# Patient Record
Sex: MALE | Race: WHITE | Employment: FULL TIME | URBAN - METROPOLITAN AREA
[De-identification: names, ages, dates, MRNs, and addresses within clinical notes are randomized per-mention and may not be internally consistent; named-entity substitution may affect disease eponyms.]

---

## 2017-01-10 ENCOUNTER — TELEPHONE (OUTPATIENT)
Dept: NURSING | Facility: CLINIC | Age: 52
End: 2017-01-10

## 2017-01-10 NOTE — TELEPHONE ENCOUNTER
"Call Type: Triage Call    Presenting Problem: Patient states he had \"Diarrhea\" on  01/08/17  lasting for 24  hours.  Now, symptoms of watery stools and \"Gas\"  have returned. Last voided over 12 hours ago.  Triage Note:  Guideline Title: Diarrhea or Other Change in Bowel Habits  Recommended Disposition: See ED Immediately  Original Inclination: Would have called clinic  Override Disposition:  Intended Action: Go to Hospital / ED  Physician Contacted: No  Signs of dehydration ?  YES  New or worsening signs and symptoms that may indicate shock ? NO  Passing red, black or tarry material from rectum AND onset of new signs and  symptoms of hypovolemia ? NO  Rectal bleeding (blood in or on the stool, more than scant; black tarry stools) ?  NO  Known diabetic and diarrhea or other change in bowel habits ? NO  High to low (but not zero) risk of exposure to Ebola within the past 21 days ? NO  Physician Instructions:  Care Advice: Protect the patient from falling or other harm.  Another adult should drive.  Call  if signs and symptoms of shock develop (such as unable to  stand due to faintness, dizziness, or lightheadedness  new onset of confusion  slow to respond or difficult to awaken  skin is pale, gray, cool, or moist to touch  severe weakness  loss of consciousness).  IMMEDIATE ACTION  Change position slowly.  Sit for a couple of minutes before standing to  walk.  Quick position changes may cause or worsen symptoms.  Write down provider's name. List or place the following in a bag for  transport with the patient: current prescription and/or nonprescription  medications  alternative treatments, therapies and medications  and street drugs.  Take sips of clear liquids (such as water, clear fruit juices without pulp,  soda, tea or coffee without dairy or non-dairy creamer, clear broth or  bouillon, oral hydration solution, or plain gelatin, fruit ices/popsicles,  hard candy) as tolerated. Sucking on ice chips is " another option.

## 2017-04-07 ENCOUNTER — TELEPHONE (OUTPATIENT)
Dept: FAMILY MEDICINE | Facility: CLINIC | Age: 52
End: 2017-04-07

## 2017-04-07 ENCOUNTER — OFFICE VISIT (OUTPATIENT)
Dept: FAMILY MEDICINE | Facility: OTHER | Age: 52
End: 2017-04-07
Payer: COMMERCIAL

## 2017-04-07 ENCOUNTER — RADIANT APPOINTMENT (OUTPATIENT)
Dept: GENERAL RADIOLOGY | Facility: OTHER | Age: 52
End: 2017-04-07
Attending: NURSE PRACTITIONER
Payer: COMMERCIAL

## 2017-04-07 VITALS
HEART RATE: 98 BPM | SYSTOLIC BLOOD PRESSURE: 122 MMHG | DIASTOLIC BLOOD PRESSURE: 72 MMHG | WEIGHT: 216 LBS | RESPIRATION RATE: 20 BRPM | HEIGHT: 71 IN | BODY MASS INDEX: 30.24 KG/M2 | OXYGEN SATURATION: 96 % | TEMPERATURE: 98.1 F

## 2017-04-07 DIAGNOSIS — R07.9 CHEST PAIN, UNSPECIFIED TYPE: Primary | ICD-10-CM

## 2017-04-07 DIAGNOSIS — R07.9 CHEST PAIN, UNSPECIFIED TYPE: ICD-10-CM

## 2017-04-07 DIAGNOSIS — R73.9 BLOOD GLUCOSE ELEVATED: ICD-10-CM

## 2017-04-07 DIAGNOSIS — R06.02 SHORTNESS OF BREATH: ICD-10-CM

## 2017-04-07 LAB
ANION GAP SERPL CALCULATED.3IONS-SCNC: 8 MMOL/L (ref 3–14)
BUN SERPL-MCNC: 11 MG/DL (ref 7–30)
CALCIUM SERPL-MCNC: 8.5 MG/DL (ref 8.5–10.1)
CHLORIDE SERPL-SCNC: 104 MMOL/L (ref 94–109)
CO2 SERPL-SCNC: 27 MMOL/L (ref 20–32)
CREAT SERPL-MCNC: 0.7 MG/DL (ref 0.66–1.25)
D DIMER PPP FEU-MCNC: 0.3 UG/ML FEU (ref 0–0.5)
ERYTHROCYTE [DISTWIDTH] IN BLOOD BY AUTOMATED COUNT: 12.6 % (ref 10–15)
GFR SERPL CREATININE-BSD FRML MDRD: ABNORMAL ML/MIN/1.7M2
GLUCOSE SERPL-MCNC: 228 MG/DL (ref 70–99)
HCT VFR BLD AUTO: 45.9 % (ref 40–53)
HGB BLD-MCNC: 16.8 G/DL (ref 13.3–17.7)
MCH RBC QN AUTO: 29 PG (ref 26.5–33)
MCHC RBC AUTO-ENTMCNC: 36.6 G/DL (ref 31.5–36.5)
MCV RBC AUTO: 79 FL (ref 78–100)
PLATELET # BLD AUTO: 269 10E9/L (ref 150–450)
POTASSIUM SERPL-SCNC: 3.9 MMOL/L (ref 3.4–5.3)
RBC # BLD AUTO: 5.8 10E12/L (ref 4.4–5.9)
SODIUM SERPL-SCNC: 139 MMOL/L (ref 133–144)
TROPONIN I SERPL-MCNC: NORMAL UG/L (ref 0–0.04)
WBC # BLD AUTO: 6.3 10E9/L (ref 4–11)

## 2017-04-07 PROCEDURE — 71020 XR CHEST 2 VW: CPT

## 2017-04-07 PROCEDURE — 36415 COLL VENOUS BLD VENIPUNCTURE: CPT | Performed by: NURSE PRACTITIONER

## 2017-04-07 PROCEDURE — 99203 OFFICE O/P NEW LOW 30 MIN: CPT | Performed by: NURSE PRACTITIONER

## 2017-04-07 PROCEDURE — 93000 ELECTROCARDIOGRAM COMPLETE: CPT | Performed by: NURSE PRACTITIONER

## 2017-04-07 PROCEDURE — 85379 FIBRIN DEGRADATION QUANT: CPT | Performed by: NURSE PRACTITIONER

## 2017-04-07 PROCEDURE — 80048 BASIC METABOLIC PNL TOTAL CA: CPT | Performed by: NURSE PRACTITIONER

## 2017-04-07 PROCEDURE — 85027 COMPLETE CBC AUTOMATED: CPT | Performed by: NURSE PRACTITIONER

## 2017-04-07 PROCEDURE — 84484 ASSAY OF TROPONIN QUANT: CPT | Performed by: NURSE PRACTITIONER

## 2017-04-07 NOTE — PROGRESS NOTES
"  SUBJECTIVE:                                                    Ray Galdamez is a 51 year old male who presents to clinic today for the following health issues:      BREATHING PROBLEM      Duration: this morning    Description (location/character/radiation): hard to get enough air this a.m.    Intensity:  mild    Accompanying signs and symptoms: ribs sore on Left side, Left side chest feels odd, muscles tight.    History (similar episodes/previous evaluation): None    Precipitating or alleviating factors: None    Therapies tried and outcome: None     He woke up this morning feeling short of breath.  It was hard to get breath in on his left side.  This lasted 1 hour and is now nearly resolved.  Still has left side \"soreness\". States it just doesn't feel right, like my muscles are tight.  No nausea, vomiting, diaphoresis.  He was able to reproduce the pain at home with palpation of his left rib area.       No fevers/chills or cough.   Has not been ill recently.   Was lifting furniture yesterday, but no injury that he knows of  No history of cardiac or lung disease.   He quit smoking 8 years ago.   Family history of cardiac disease in his father and paternal uncles, nothing prior to age 50.     Problem list and histories reviewed & adjusted, as indicated.  Additional history: none    Patient Active Problem List   Diagnosis     CARDIOVASCULAR SCREENING; LDL GOAL LESS THAN 160     History reviewed. No pertinent surgical history.    Social History   Substance Use Topics     Smoking status: Never Smoker     Smokeless tobacco: Never Used     Alcohol use Not on file     History reviewed. No pertinent family history.      Current Outpatient Prescriptions   Medication Sig Dispense Refill     [DISCONTINUED] UNKNOWN TO PATIENT Took generic children's allergy medication       No Known Allergies  BP Readings from Last 3 Encounters:   04/07/17 122/72   10/10/14 140/90   03/10/14 116/85    Wt Readings from Last 3 Encounters: " "  04/07/17 216 lb (98 kg)   03/10/14 220 lb (99.8 kg)   05/10/12 229 lb (103.9 kg)                    Reviewed and updated as needed this visit by clinical staff  Tobacco  Allergies  Meds  Med Hx  Surg Hx  Fam Hx  Soc Hx      Reviewed and updated as needed this visit by Provider         ROS:  C: NEGATIVE for fever, chills, change in weight  E/M: NEGATIVE for ear, mouth and throat problems  RESP:as above   CV: as above   GI: NEGATIVE for nausea, abdominal pain, heartburn, or change in bowel habits    OBJECTIVE:                                                    /72  Pulse 98  Temp 98.1  F (36.7  C) (Tympanic)  Resp 20  Ht 5' 10.5\" (1.791 m)  Wt 216 lb (98 kg)  SpO2 96%  BMI 30.55 kg/m2  Body mass index is 30.55 kg/(m^2).  GENERAL: healthy, alert and no distress  EYES: Eyes grossly normal to inspection, PERRL and conjunctivae and sclerae normal  HENT: ear canals and TM's normal, nose and mouth without ulcers or lesions  NECK: no adenopathy, no asymmetry, masses, or scars and thyroid normal to palpation  RESP: lungs clear to auscultation - no rales, rhonchi or wheezes  CV: regular rate and rhythm, normal S1 S2, no S3 or S4, no murmur, click or rub, no peripheral edema.  I cannot reproduce the chest pain with palpation   ABDOMEN: soft, nontender, no hepatosplenomegaly, no masses and bowel sounds normal  MS: no gross musculoskeletal defects noted, no edema    Diagnostic Test Results:  Office Visit on 04/07/2017   Component Date Value Ref Range Status     Troponin I ES 04/07/2017   0.000 - 0.045 ug/L Final                    Value:<0.015  The 99th percentile for upper reference range is 0.045 ug/L.  Troponin values in   the range of 0.045 - 0.120 ug/L may be associated with risks of adverse   clinical events.       WBC 04/07/2017 6.3  4.0 - 11.0 10e9/L Final     RBC Count 04/07/2017 5.80  4.4 - 5.9 10e12/L Final     Hemoglobin 04/07/2017 16.8  13.3 - 17.7 g/dL Final     Hematocrit 04/07/2017 45.9  40.0 " - 53.0 % Final     MCV 04/07/2017 79  78 - 100 fl Final     MCH 04/07/2017 29.0  26.5 - 33.0 pg Final     MCHC 04/07/2017 36.6* 31.5 - 36.5 g/dL Final     RDW 04/07/2017 12.6  10.0 - 15.0 % Final     Platelet Count 04/07/2017 269  150 - 450 10e9/L Final     Sodium 04/07/2017 139  133 - 144 mmol/L Final     Potassium 04/07/2017 3.9  3.4 - 5.3 mmol/L Final     Chloride 04/07/2017 104  94 - 109 mmol/L Final     Carbon Dioxide 04/07/2017 27  20 - 32 mmol/L Final     Anion Gap 04/07/2017 8  3 - 14 mmol/L Final     Glucose 04/07/2017 228* 70 - 99 mg/dL Final     Urea Nitrogen 04/07/2017 11  7 - 30 mg/dL Final     Creatinine 04/07/2017 0.70  0.66 - 1.25 mg/dL Final     GFR Estimate 04/07/2017   >60 mL/min/1.7m2 Final                    Value:>90  Non  GFR Calc       GFR Estimate If Black 04/07/2017   >60 mL/min/1.7m2 Final                    Value:>90   GFR Calc       Calcium 04/07/2017 8.5  8.5 - 10.1 mg/dL Final     D Dimer 04/07/2017 0.3  0.0 - 0.50 ug/ml FEU Final    Comment: This D-dimer assay is intended for use in conjuntion with a clinical pretest   probability assessment model to exclude pulmonary embolism (PE) and as an aid   in the diagnosis of deep venous thrombosis (DVT) in outpatients suspected of   PE   or DVT. The cut-off value is 0.5 g/mL FEU.           CXR - negative by my reading.  Pending radiology review.   EKG - appears normal, NSR, normal axis, normal intervals, no acute ST/T changes c/w ischemia, no LVH by voltage criteria, there are no prior tracings available     ASSESSMENT/PLAN:                                                        1. Chest pain, unspecified type  Labs, EKG reassuring.  Pain is nearly resolved and he was able to reproduce it with palpation this morning (I was not able to on my exam today, however).  This is likely musculoskeletal in nature and I advised rest and anti-inflammatory medications.  However, given his age and family history, if this  does not completely resolve or recurs, he will need a stress test.  He is agreeable to this and will contact us if this fails to resolve.    - Troponin I  - EKG 12-lead complete w/read - Clinics  - CBC with platelets  - Basic metabolic panel  (Ca, Cl, CO2, Creat, Gluc, K, Na, BUN)  - XR Chest 2 Views; Future  - D dimer, quantitative    2. Shortness of breath  As above   - Troponin I  - EKG 12-lead complete w/read - Clinics  - CBC with platelets  - Basic metabolic panel  (Ca, Cl, CO2, Creat, Gluc, K, Na, BUN)  - XR Chest 2 Views; Future  - D dimer, quantitative    See Patient Instructions    TERI Leach [facility]

## 2017-04-07 NOTE — PATIENT INSTRUCTIONS
We will call you this afternoon with the lab results.     I did not see any abnormalities on your x-ray.  The radiologist will review it as well and they will call if they see anything I did not see.     Rest for a few days and take Ibuprofen as needed     If this does not go completely away, you need a stress test.  Call me in that event.

## 2017-04-07 NOTE — NURSING NOTE
"Chief Complaint   Patient presents with     Breathing Problem     trouble breathing upon waking this a.m.       Initial /72  Pulse 98  Temp 98.1  F (36.7  C) (Tympanic)  Resp 20  Ht 5' 10.5\" (1.791 m)  Wt 216 lb (98 kg)  SpO2 96%  BMI 30.55 kg/m2 Estimated body mass index is 30.55 kg/(m^2) as calculated from the following:    Height as of this encounter: 5' 10.5\" (1.791 m).    Weight as of this encounter: 216 lb (98 kg).  Medication Reconciliation: complete   ................Xavi Doshi LPN,   April 7, 2017,      9:51 AM,   [facility]     "

## 2017-04-07 NOTE — MR AVS SNAPSHOT
"              After Visit Summary   4/7/2017    Ray Galdamez    MRN: [id number]           Patient Information     Date Of Birth          1965        Visit Information        Provider Department      4/7/2017 10:00 AM Mary Dasilva APRN [facility]        Today's Diagnoses     Chest pain, unspecified type    -  1    Shortness of breath          Care Instructions    We will call you this afternoon with the lab results.     I did not see any abnormalities on your x-ray.  The radiologist will review it as well and they will call if they see anything I did not see.     Rest for a few days and take Ibuprofen as needed     If this does not go completely away, you need a stress test.  Call me in that event.                 Follow-ups after your visit        Who to contact     If you have questions or need follow up information about today's clinic visit or your schedule please contact [facility] directly at [phone].  Normal or non-critical lab and imaging results will be communicated to you by MyChart, letter or phone within 4 business days after the clinic has received the results. If you do not hear from us within 7 days, please contact the clinic through Tweetworks[employer] or phone. If you have a critical or abnormal lab result, we will notify you by phone as soon as possible.  Submit refill requests through [employer] or call your pharmacy and they will forward the refill request to us. Please allow 3 business days for your refill to be completed.          Additional Information About Your Visit        MyChart Information     [employer] lets you send messages to your doctor, view your test results, renew your prescriptions, schedule appointments and more. To sign up, go to [email or web address] . Click on \"Log in\" on the left side of the screen, which will take you to the Welcome page. Then click on \"Sign up Now\" on the right side of the page.     You will be asked to enter the access " "code listed below, as well as some personal information. Please follow the directions to create your username and password.     Your access code is: [email or web address]  Expires: 2017 10:39 AM     Your access code will  in 90 days. If you need help or a new code, please call your [facility] or [phone].        Care EveryWhere ID     This is your Care EveryWhere ID. This could be used by other organizations to access your [place] medical records  [id number]        Your Vitals Were     Pulse Temperature Respirations Height Pulse Oximetry BMI (Body Mass Index)    98 98.1  F (36.7  C) (Tympanic) 20 5' 10.5\" (1.791 m) 96% 30.55 kg/m2       Blood Pressure from Last 3 Encounters:   17 122/72   10/10/14 140/90   03/10/14 116/85    Weight from Last 3 Encounters:   17 216 lb (98 kg)   03/10/14 220 lb (99.8 kg)   05/10/12 229 lb (103.9 kg)              We Performed the Following     Basic metabolic panel  (Ca, Cl, CO2, Creat, Gluc, K, Na, BUN)     CBC with platelets     D dimer, quantitative     EKG 12-lead complete w/read - Clinics     Troponin I        Primary Care Provider Office Phone #    [place] [facility] [phone]       No address on file        Thank you!     Thank you for choosing [facility]  for your care. Our goal is always to provide you with excellent care. Hearing back from our patients is one way we can continue to improve our services. Please take a few minutes to complete the written survey that you may receive in the mail after your visit with us. Thank you!             Your Updated Medication List - Protect others around you: Learn how to safely use, store and throw away your medicines at www.disposemymeds.org.      Notice  As of 2017 10:39 AM    You have not been prescribed any medications.      "

## 2017-04-10 ENCOUNTER — TELEPHONE (OUTPATIENT)
Dept: FAMILY MEDICINE | Facility: OTHER | Age: 52
End: 2017-04-10

## 2017-04-10 DIAGNOSIS — R73.9 ELEVATED SERUM GLUCOSE: Primary | ICD-10-CM

## 2017-04-10 NOTE — TELEPHONE ENCOUNTER
Reason for Call:  Request for results:    Name of test or procedure: lab     Date of test of procedure: 4/7/17    Location of the test or procedure: [facility]    OK to leave the result message on voice mail or with a family member? YES    Phone number Patient can be reached at:  Home number on file [phone] (home)    Additional comments: Requesting results     Call taken on 4/10/2017 at 1:20 PM by Marina Mims

## 2017-04-10 NOTE — TELEPHONE ENCOUNTER
Notes Recorded by Mary Dasilva APRN CNP on 4/7/2017 at 1:15 PM  Please call patient and advise all his lab testing was normal and the radiologist did not see anything abnormal on his chest x-ray.  If his symptoms don't completely resolve, or if this happens again, he needs to get a stress test done.  Have him contact us in that event.     Patient notified of results, a1c ordered and pt to set up lab only appt. Patient voiced understanding.   Xavi Doshi LPN,  April 10, 2017 3:41 PM,  [facility]

## 2017-04-10 NOTE — PATIENT INSTRUCTIONS
Notes Recorded by Mary Dasilva APRN CNP on 4/7/2017 at 1:15 PM  Please call patient and advise all his lab testing was normal and the radiologist did not see anything abnormal on his chest x-ray.  If his symptoms don't completely resolve, or if this happens again, he needs to get a stress test done.  Have him contact us in that event.

## 2017-04-10 NOTE — PROGRESS NOTES
Called pt - informed of results. Patient voiced understanding.   ................Xavi Doshi LPN,   April 10, 2017,      4:00 PM,   [facility]

## 2017-11-28 ENCOUNTER — OFFICE VISIT (OUTPATIENT)
Dept: URGENT CARE | Facility: RETAIL CLINIC | Age: 52
End: 2017-11-28
Payer: COMMERCIAL

## 2017-11-28 ENCOUNTER — TELEPHONE (OUTPATIENT)
Dept: FAMILY MEDICINE | Facility: CLINIC | Age: 52
End: 2017-11-28

## 2017-11-28 VITALS
HEART RATE: 77 BPM | TEMPERATURE: 97.9 F | SYSTOLIC BLOOD PRESSURE: 126 MMHG | BODY MASS INDEX: 30.55 KG/M2 | OXYGEN SATURATION: 95 % | WEIGHT: 216 LBS | DIASTOLIC BLOOD PRESSURE: 90 MMHG

## 2017-11-28 DIAGNOSIS — R03.0 ELEVATED BLOOD PRESSURE READING WITHOUT DIAGNOSIS OF HYPERTENSION: ICD-10-CM

## 2017-11-28 DIAGNOSIS — L03.114 CELLULITIS OF HAND, LEFT: Primary | ICD-10-CM

## 2017-11-28 PROCEDURE — 99203 OFFICE O/P NEW LOW 30 MIN: CPT | Performed by: PHYSICIAN ASSISTANT

## 2017-11-28 RX ORDER — CEPHALEXIN 500 MG/1
500 CAPSULE ORAL 3 TIMES DAILY
Qty: 30 CAPSULE | Refills: 0 | Status: SHIPPED | OUTPATIENT
Start: 2017-11-28 | End: 2018-03-21

## 2017-11-28 ASSESSMENT — PAIN SCALES - GENERAL: PAINLEVEL: NO PAIN (0)

## 2017-11-28 NOTE — MR AVS SNAPSHOT
"              After Visit Summary   11/28/2017    Ray Galdamez    MRN: [id number]           Patient Information     Date Of Birth          1965        Visit Information        Provider Department      11/28/2017 5:40 PM Jeny Romero PA-C [facility]        Today's Diagnoses     Cellulitis of hand, left    -  1      Care Instructions    Your blood pressure is elevated at today's visit.  Please check your BP twice in the next week or so.  You should follow up with your primary provider regarding possible hypertension if your recheck are greater than 140/90.  Check your blood pressure several times in the next week or so. You can do this at local pharmacies, grocery stores or with the float nurse at the [facility]. Record your readings and take them with you to your appointment.  Goal BP <140/90 (new < 130/80)  Do not take decongestants - they can raise your BP.  If you have chest pain, unusual headaches, vision changes or any sign or symptoms of stroke seek prompt medical attention.    /90 (BP Location: Right arm, Patient Position: Chair, Cuff Size: Adult Large)  Pulse 77  Temp 97.9  F (36.6  C) (Tympanic)  Wt 216 lb (98 kg)  SpO2 95%  BMI 30.55 kg/m2    Please FOLLOW UP at primary care clinic if not improving, new symptoms, worse or this does not resolve.            Follow-ups after your visit        Who to contact     You can reach your care team any time of the day by calling [phone].  Notification of test results:  If you have an abnormal lab result, we will notify you by phone as soon as possible.         Additional Information About Your Visit        Evolero Information     E[employer]ero lets you send messages to your doctor, view your test results, renew your prescriptions, schedule appointments and more. To sign up, go to [email or web address] . Click on \"Log in\" on the left side of the screen, which will take you to the Welcome page. Then click on \"Sign up " "Now\" on the right side of the page.     You will be asked to enter the access code listed below, as well as some personal information. Please follow the directions to create your username and password.     Your access code is: [email or web address]  Expires: 2018  6:19 PM     Your access code will  in 90 days. If you need help or a new code, please call your [facility] or [phone].        Care EveryWhere ID     This is your Care EveryWhere ID. This could be used by other organizations to access your [place] medical records  [id number]        Your Vitals Were     Pulse Temperature Pulse Oximetry BMI (Body Mass Index)          77 97.9  F (36.6  C) (Tympanic) 95% 30.55 kg/m2         Blood Pressure from Last 3 Encounters:   17 126/90   17 122/72   10/10/14 140/90    Weight from Last 3 Encounters:   17 216 lb (98 kg)   17 216 lb (98 kg)   03/10/14 220 lb (99.8 kg)              Today, you had the following     No orders found for display         Today's Medication Changes          These changes are accurate as of: 17  6:20 PM.  If you have any questions, ask your nurse or doctor.               Start taking these medicines.        Dose/Directions    cephALEXin 500 MG capsule   Commonly known as:  KEFLEX   Used for:  Cellulitis of hand, left   Started by:  Jeny Romero PA-C        Dose:  500 mg   Take 1 capsule (500 mg) by mouth 3 times daily   Quantity:  30 capsule   Refills:  0            Where to get your medicines      These medications were sent to [address] - [address]  [address], [facility] [address]     Phone:  [phone]     cephALEXin 500 MG capsule                Primary Care Provider Office Phone # Fax #    [place] [facility] [phone]       [address] [facility] [address]        Equal Access to Services     ASUNCION YOUNG AH: Hadii viki mena hadasho Soomaali, waaxda luqadaha, qaybta kaalmada chapin, jose manuel " tenzin caglebayron bond'aan ah. So [facility] [phone].    ATENCIÓN: Si habla kaushal, tiene a steele disposición servicios gratuitos de asistencia lingüística. Rowdy al [phone].    We comply with applicable federal civil rights laws and [place] laws. We do not discriminate on the basis of race, color, national origin, age, disability, sex, sexual orientation, or gender identity.            Thank you!     Thank you for choosing [facility]  for your care. Our goal is always to provide you with excellent care. Hearing back from our patients is one way we can continue to improve our services. Please take a few minutes to complete the written survey that you may receive in the mail after your visit with us. Thank you!             Your Updated Medication List - Protect others around you: Learn how to safely use, store and throw away your medicines at www.disposemymeds.org.          This list is accurate as of: 11/28/17  6:20 PM.  Always use your most recent med list.                   Brand Name Dispense Instructions for use Diagnosis    cephALEXin 500 MG capsule    KEFLEX    30 capsule    Take 1 capsule (500 mg) by mouth 3 times daily    Cellulitis of hand, left

## 2017-11-28 NOTE — NURSING NOTE
"Chief Complaint   Patient presents with     Hand Pain     left hand fourth didgit, was poked with a screw on sunday at home, barley poked the skin he states one little drop of blood. Spoke with nurse/PCP today was told have finger checked for infection. Has been doing ice at home. Last Tdap 9/27/2014       Initial /90 (BP Location: Right arm, Patient Position: Chair, Cuff Size: Adult Large)  Pulse 77  Temp 97.9  F (36.6  C) (Tympanic)  Wt 216 lb (98 kg)  SpO2 95%  BMI 30.55 kg/m2 Estimated body mass index is 30.55 kg/(m^2) as calculated from the following:    Height as of 4/7/17: 5' 10.5\" (1.791 m).    Weight as of this encounter: 216 lb (98 kg).  Medication Reconciliation: complete     Jessica Sundet      "

## 2017-11-28 NOTE — TELEPHONE ENCOUNTER
Reason for call:  Patient reporting a symptom    Symptom or request: possible infected finger     Duration (how long have symptoms been present): 3 days     Have you been treated for this before? No    Additional comments: Poked finger with a screw on 11/26 and now has a swollen finger. He is wondering if he should be seen or looking for nurse advice on how to take care of it.   Phone Number patient can be reached at:  Home number on file [phone] (home)    Best Time:       Can we leave a detailed message on this number:  YES    Call taken on 11/28/2017 at 9:03 AM by Marina Mims

## 2017-11-28 NOTE — TELEPHONE ENCOUNTER
Called and spoke to the patient. He said on Sunday he was out in his garage when he pricked his finger with a screw on accident. Patient said the area swelled up pretty good and became pretty sore. Patient said since then, his finger feels really stiff. He said the finger is still swollen and the finger was slightly warm to the touch this morning. Patient said he can bend the finger but it is hard due to the swelling. He said the finger is slightly red. No drainage. No fever. Patient said he has been icing the area. Patient thinks he last Tetanus was about 5 years ago at a different facility. He said besides the slight redness, you can't really tell anything is there.     Patient is wondering if he should keep icing the area and watching it? Or if he should come in and have someone look at it? He is concerned about the swelling and finger feeling stiff.     Will route to Dr. SEVERINO (NADIRA) since Mary is not in clinic.     lGoria Sadler RN  [facility]

## 2017-11-28 NOTE — TELEPHONE ENCOUNTER
Per Dr. Díaz, patient should be seen. Patient was offered an appointment tomorrow with Dr. SEVERINO but he declined. Nurse sent back to triage to call patient back.     RN called and spoke to patient. Informed him he should be seen to rule out infection. Offered him a 12:00 appointment tomorrow and patient declined. RN looked at both [facility] and [facility] today and tomorrow and no other options. Patient said he would head to [facility] today to have it looked at. He will call back with any other questions or concerns.     Gloria Sadler RN  [facility]

## 2017-11-29 NOTE — PATIENT INSTRUCTIONS
Your blood pressure is elevated at today's visit.  Please check your BP twice in the next week or so.  You should follow up with your primary provider regarding possible hypertension if your recheck are greater than 140/90.  Check your blood pressure several times in the next week or so. You can do this at local pharmacies, grocery stores or with the float nurse at the [facility]. Record your readings and take them with you to your appointment.  Goal BP <140/90 (new < 130/80)  Do not take decongestants - they can raise your BP.  If you have chest pain, unusual headaches, vision changes or any sign or symptoms of stroke seek prompt medical attention.    /90 (BP Location: Right arm, Patient Position: Chair, Cuff Size: Adult Large)  Pulse 77  Temp 97.9  F (36.6  C) (Tympanic)  Wt 216 lb (98 kg)  SpO2 95%  BMI 30.55 kg/m2    Please FOLLOW UP at primary care clinic if not improving, new symptoms, worse or this does not resolve.

## 2017-11-29 NOTE — PROGRESS NOTES
[facility]  See CC. Possible infection left 4th finger. He had a small puncture wound MIP and yest was redder, warmer and today a little better but some redness and tenderness into palm.    ROS:He does not feel ill. No other concerns.  ENT - denies ear pain, throat pain. No nasal congestion.  CP - no cough,SOB or chest pain.   GI/ - Appetite - normal. No nausea, vomiting or diarrhea.   No bowel or bladder changes   MSK - no joint pain or swelling other than left hand.  Skin-  NO rashes. NO lesions.  Last Td - 2014    No past medical history on file.  No past surgical history on file.  Patient Active Problem List   Diagnosis     CARDIOVASCULAR SCREENING; LDL GOAL LESS THAN 160     Current Outpatient Prescriptions   Medication     [DISCONTINUED] UNKNOWN TO PATIENT     No current facility-administered medications for this visit.      O: /90 (BP Location: Right arm, Patient Position: Chair, Cuff Size: Adult Large)  Pulse 77  Temp 97.9  F (36.6  C) (Tympanic)  Wt 216 lb (98 kg)  SpO2 95%  BMI 30.55 kg/m2  Left 4th finger there is some redness and swelling proximal phalanyx - ROM complete but tender with extension. Faint redness into palm - outlined in skin marker. Small puncture wound noted fat pad but minimal redness there.  No open areas.  Circ and sensation WNL    A:    Cellulitis of hand, left  Elevated blood pressure reading without diagnosis of hypertension      P: Warm packs  Elevate  < use  Prescriptions as below. Discussed indications, dosing, side affects and adverse reactions of medications with  Patient -Ceph - eat yogurt -take a probiotic daily when on antibiotics.  If > redness beyond skin marker or 'worse' or not improving seek prompt medical attention.  Also discussed BP -needs recheck    Declined note for work    AVS given and discussed:  Patient Instructions   Your blood pressure is elevated at today's visit.  Please check your BP twice in the next week or so.  You  should follow up with your primary provider regarding possible hypertension if your recheck are greater than 140/90.  Check your blood pressure several times in the next week or so. You can do this at local pharmacies, grocery stores or with the float nurse at the [facility]. Record your readings and take them with you to your appointment.  Goal BP <140/90 (new < 130/80)  Do not take decongestants - they can raise your BP.  If you have chest pain, unusual headaches, vision changes or any sign or symptoms of stroke seek prompt medical attention.    /90 (BP Location: Right arm, Patient Position: Chair, Cuff Size: Adult Large)  Pulse 77  Temp 97.9  F (36.6  C) (Tympanic)  Wt 216 lb (98 kg)  SpO2 95%  BMI 30.55 kg/m2    Please FOLLOW UP at primary care clinic if not improving, new symptoms, worse or this does not resolve.        Patient is comfortable with this plan  Electronically signed,  GEOVANNY Romero, PAC

## 2018-03-21 ENCOUNTER — OFFICE VISIT (OUTPATIENT)
Dept: FAMILY MEDICINE | Facility: CLINIC | Age: 53
End: 2018-03-21
Payer: COMMERCIAL

## 2018-03-21 VITALS
HEIGHT: 71 IN | HEART RATE: 76 BPM | DIASTOLIC BLOOD PRESSURE: 80 MMHG | BODY MASS INDEX: 30.38 KG/M2 | RESPIRATION RATE: 16 BRPM | OXYGEN SATURATION: 96 % | WEIGHT: 217 LBS | TEMPERATURE: 97.6 F | SYSTOLIC BLOOD PRESSURE: 128 MMHG

## 2018-03-21 DIAGNOSIS — Z12.11 SPECIAL SCREENING FOR MALIGNANT NEOPLASMS, COLON: ICD-10-CM

## 2018-03-21 DIAGNOSIS — R06.83 SNORING: ICD-10-CM

## 2018-03-21 DIAGNOSIS — R63.1 POLYDIPSIA: ICD-10-CM

## 2018-03-21 DIAGNOSIS — Z00.01 ENCOUNTER FOR WELL ADULT EXAM WITH ABNORMAL FINDINGS: Primary | ICD-10-CM

## 2018-03-21 DIAGNOSIS — G47.33 OSA (OBSTRUCTIVE SLEEP APNEA): ICD-10-CM

## 2018-03-21 LAB
CHOLEST SERPL-MCNC: 267 MG/DL
GLUCOSE SERPL-MCNC: 189 MG/DL (ref 70–99)
HBA1C MFR BLD: 11.4 % (ref 4.3–6)
HDLC SERPL-MCNC: 34 MG/DL
LDLC SERPL CALC-MCNC: ABNORMAL MG/DL
NONHDLC SERPL-MCNC: 233 MG/DL
PSA SERPL-ACNC: 0.78 UG/L (ref 0–4)
TRIGL SERPL-MCNC: 938 MG/DL

## 2018-03-21 PROCEDURE — 80061 LIPID PANEL: CPT | Performed by: OBSTETRICS & GYNECOLOGY

## 2018-03-21 PROCEDURE — 36415 COLL VENOUS BLD VENIPUNCTURE: CPT | Performed by: OBSTETRICS & GYNECOLOGY

## 2018-03-21 PROCEDURE — 82947 ASSAY GLUCOSE BLOOD QUANT: CPT | Performed by: OBSTETRICS & GYNECOLOGY

## 2018-03-21 PROCEDURE — 99396 PREV VISIT EST AGE 40-64: CPT | Performed by: OBSTETRICS & GYNECOLOGY

## 2018-03-21 PROCEDURE — G0103 PSA SCREENING: HCPCS | Performed by: OBSTETRICS & GYNECOLOGY

## 2018-03-21 PROCEDURE — 99213 OFFICE O/P EST LOW 20 MIN: CPT | Mod: 25 | Performed by: OBSTETRICS & GYNECOLOGY

## 2018-03-21 PROCEDURE — 83036 HEMOGLOBIN GLYCOSYLATED A1C: CPT | Performed by: OBSTETRICS & GYNECOLOGY

## 2018-03-21 ASSESSMENT — PAIN SCALES - GENERAL: PAINLEVEL: NO PAIN (0)

## 2018-03-21 NOTE — NURSING NOTE
"Chief Complaint   Patient presents with     Physical     Diabetes       Initial /80  Pulse 76  Temp 97.6  F (36.4  C) (Temporal)  Resp 16  Ht 5' 10.5\" (1.791 m)  Wt 217 lb (98.4 kg)  SpO2 96%  BMI 30.7 kg/m2 Estimated body mass index is 30.7 kg/(m^2) as calculated from the following:    Height as of this encounter: 5' 10.5\" (1.791 m).    Weight as of this encounter: 217 lb (98.4 kg)..   BP completed using cuff size: large  Medication Rec Completed    Shanita Hardin CMA    "

## 2018-03-21 NOTE — PROGRESS NOTES
"Subjective:Ray is here for yearly physical. Current concerns are:  See below in ROS        History reviewed. No pertinent past medical history.   Current Outpatient Prescriptions   Medication Sig Dispense Refill     [DISCONTINUED] UNKNOWN TO PATIENT Took generic children's allergy medication        No Known Allergies   History   Smoking Status     Never Smoker   Smokeless Tobacco     Never Used      History reviewed. No pertinent surgical history.   Social History   Substance Use Topics     Smoking status: Never Smoker     Smokeless tobacco: Never Used     Alcohol use Not on file      Family History   Problem Relation Age of Onset     DIABETES Mother      DIABETES Father      Myocardial Infarction Father        ROS: A 12 point review of systems is negative except for the following:  He has had polydipsia for the past few months. Not sure about any weight change.  + FH of DM in both parents.    2. He thinks he has sleep apnea.  Feels poorly in the morning and isnt sure he was breathing at all times overnight.        Physical Exam: /80  Pulse 76  Temp 97.6  F (36.4  C) (Temporal)  Resp 16  Ht 5' 10.5\" (1.791 m)  Wt 217 lb (98.4 kg)  SpO2 96%  BMI 30.7 kg/m2  HEENT:    Sclerae and conjunctiva are normal.   Ear canals and TMs look normal.  Nasal mucosa is pink  - no polyps or masses seen.  sinuses are non tender to palpation.  Throat is unremarkable . Mucous membranes are moist.   Neck is supple, mobile, no adenopathy or masses palpable. The thyroid feels normal.   Normal range of motion noted.  Chest is clear to auscultation.  No wheezes, rales or rhonchi heard.  Cardiac exam is normal with s1, s2, no murmurs or adventitious sounds.Normal rate and rhythm is heard.   Abdomen is soft,  nondistended, No masses felt.No HSM. No guarding or rigidity or rebound   noted. Palpation reveals  no    tenderness   Normal bowel sounds heard.     Extremities Without edema normal pulses and sensation in his feet and " normal capillary refill.   Genital exam is normal. Testicles are descended. Penis is circumcised. No testicular masses noted.  He declined a rectal exam and prostate exam.                Assessment/Plan:        The yearly exam is normal except for :    1. Polydipsia. I will check fasting glucose and A1c to screen for diabetes    2. Symptoms of obstructive sleep apnea. I will refer him for sleep study.    3. I will check fasting lipids and PSA for screening purposes  4. Obesity-I advised weight loss.          Additional Plan:Diet and rest and exercise discussed.      I have advised going for a 5 mile walk daily if possible       See labs and orders.    .Immunizations reviewed(TDAP, pneumovax, shingles vaccine,etc)and discussed-including advice for yearly flu shot/tetanus update.     The following vaccines given today:   He had a tetanus shot at another clinic not related to her health system so he is up-to-date.    Vaccines were discussed and  declined        Colonoscopy at age 52  - now      Labs done: see orders      I advised the following exams with specialists:    1. Dental evaluation yearly    2. Dermatology evaluation for mole exam yearly    3. Ophthalmology exam yearly to check for glaucoma, etc        Living will was discussed.         Followup in 12   months, sooner if concerns arise.   ELADIA Haq MD(electronic signature)    Answers for HPI/ROS submitted by the patient on 3/21/2018   Annual Exam:  Getting at least 3 servings of Calcium per day:: Yes  Bi-annual eye exam:: Yes  Dental care twice a year:: NO  Sleep apnea or symptoms of sleep apnea:: Daytime drowsiness, Excessive snoring  Diet:: Regular (no restrictions)  Frequency of exercise:: 1 day/week  Taking medications regularly:: Yes  Medication side effects:: None  Additional concerns today:: YES  PHQ-2 Score: 0  Duration of exercise:: 15-30 minutes

## 2018-03-21 NOTE — MR AVS SNAPSHOT
After Visit Summary   3/21/2018    Ray Galdamez    MRN: [id number]           Patient Information     Date Of Birth          1965        Visit Information        Provider Department      3/21/2018 2:30 PM Berto Haq MD [facility]        Today's Diagnoses     Encounter for well adult exam with abnormal findings    -  1    Polydipsia        PAULA (obstructive sleep apnea)        Special screening for malignant neoplasms, colon        Snoring           Follow-ups after your visit        Additional Services     GASTROENTEROLOGY ADULT REF PROCEDURE ONLY [facility] [phone]; No Provider Preference       Last Lab Result: Creatinine (mg/dL)       Date                     Value                 04/07/2017               0.70             ----------  Body mass index is 30.7 kg/(m^2).     Needed:  No  Language:  English    Patient will be contacted to schedule procedure.     Please be aware that coverage of these services is subject to the terms and limitations of your health insurance plan.  Call member services at your health plan with any benefit or coverage questions.  Any procedures must be performed at a [place] facility OR coordinated by your clinic's referral office.    Please bring the following with you to your appointment:    (1) Any X-Rays, CTs or MRIs which have been performed.  Contact the facility where they were done to arrange for  prior to your scheduled appointment.    (2) List of current medications   (3) This referral request   (4) Any documents/labs given to you for this referral            SLEEP EVALUATION & MANAGEMENT REFERRAL - [facility] [place] [phone] (Age 13 if over 100 lbs)       Please be aware that coverage of these services is subject to the terms and limitations of your health insurance plan.  Call member services at your health plan with any benefit or coverage questions.      Please  "bring the following to your appointment:    >>   List of current medications   >>   This referral request   >>   Any documents/labs given to you for this referral                      Future tests that were ordered for you today     Open Future Orders        Priority Expected Expires Ordered    SLEEP EVALUATION & MANAGEMENT REFERRAL - ADULT -[facility] [place] [phone] (Age 13 if over 100 lbs) Routine  3/21/2019 3/21/2018            Who to contact     If you have questions or need follow up information about today's clinic visit or your schedule please contact [facility] directly at [phone].  Normal or non-critical lab and imaging results will be communicated to you by AdBira Networkhart, letter or phone within 4 business days after the clinic has received the results. If you do not hear from us within 7 days, please contact the clinic through [employer] or phone. If you have a critical or abnormal lab result, we will notify you by phone as soon as possible.  Submit refill requests through Medigus or call your pharmacy and they will forward the refill request to us. Please allow 3 business days for your refill to be completed.          Additional Information About Your Visit        MyChart Information     Medigus lets you send messages to your doctor, view your test results, renew your prescriptions, schedule appointments and more. To sign up, go to [email or web address] . Click on \"Log in\" on the left side of the screen, which will take you to the Welcome page. Then click on \"Sign up Now\" on the right side of the page.     You will be asked to enter the access code listed below, as well as some personal information. Please follow the directions to create your username and password.     Your access code is: [email or web address]  Expires: 2018  4:16 PM     Your access code will  in 90 days. If you need help or a new code, please call your [facility] or [phone].        Care " "EveryWhere ID     This is your Care EveryWhere ID. This could be used by other organizations to access your [facility] medical records  [id number]        Your Vitals Were     Pulse Temperature Respirations Height Pulse Oximetry BMI (Body Mass Index)    76 97.6  F (36.4  C) (Temporal) 16 5' 10.5\" (1.791 m) 96% 30.7 kg/m2       Blood Pressure from Last 3 Encounters:   03/21/18 128/80   11/28/17 126/90   04/07/17 122/72    Weight from Last 3 Encounters:   03/21/18 217 lb (98.4 kg)   11/28/17 216 lb (98 kg)   04/07/17 216 lb (98 kg)              We Performed the Following     GASTROENTEROLOGY ADULT REF PROCEDURE ONLY [facility] [phone]; No Provider Preference     Glucose     Hemoglobin A1c     Lipid Profile     OFFICE/OUTPT VISIT,EST,LEVL III     PSA, screen        Primary Care Provider Office Phone # Fax #    [facility] [phone]       [address] [facility] [address]        Equal Access to Services     [facility] CenterEL[facility]IA : Hadii viki ku hadasho Soomaali, waaxda luqadaha, qaybta kaalmada adebayronyamukul, jose manuel blevins. So [facility] [phone].    ATENCIÓN: Si habla español, tiene a steele disposición servicios gratuitos de asistencia lingüística. Llame al [phone].    We comply with applicable federal civil rights laws and [place] laws. We do not discriminate on the basis of race, color, national origin, age, disability, sex, sexual orientation, or gender identity.            Thank you!     Thank you for choosing [facility]  for your care. Our goal is always to provide you with excellent care. Hearing back from our patients is one way we can continue to improve our services. Please take a few minutes to complete the written survey that you may receive in the mail after your visit with us. Thank you!             Your Updated Medication List - Protect others around you: Learn how to safely use, store and throw away your medicines at " www.disposemymeds.org.      Notice  As of 3/21/2018  4:16 PM    You have not been prescribed any medications.

## 2018-03-22 ENCOUNTER — TELEPHONE (OUTPATIENT)
Dept: FAMILY MEDICINE | Facility: CLINIC | Age: 53
End: 2018-03-22

## 2018-03-22 DIAGNOSIS — E11.9 DIABETES MELLITUS (H): Primary | ICD-10-CM

## 2018-03-22 NOTE — TELEPHONE ENCOUNTER
Reason for Call:  Request for results:    Name of test or procedure: Labs     Date of test of procedure: 3/21    Location of the test or procedure: [facility]     OK to leave the result message on voice mail or with a family member? YES    Phone number Patient can be reached at:  Home number on file [phone] (home)    Additional comments: Please call patient with the lab results from 3/21 that he had done.     Call taken on 3/22/2018 at 1:41 PM by Rodolfo Salmon

## 2018-03-23 ENCOUNTER — TELEPHONE (OUTPATIENT)
Dept: FAMILY MEDICINE | Facility: CLINIC | Age: 53
End: 2018-03-23

## 2018-03-23 NOTE — PROGRESS NOTES
Shanita Please inform Ray/ or caretaker  that this result(s) is/are not normal- he has diabetes- please set up diabetic ed, start him on metformin 500 mg daily and ask him to see me again next week to discuss this further-Thanks. ELADIA Haq MD

## 2018-03-23 NOTE — TELEPHONE ENCOUNTER
Per RM labs confirm diabetes diagnosis. Per RM referral to Diabetic Ed and patient needs to see us soon to initiate medication. Per RM if patient would like can start Metformin 500 mg one tablet daily to start control of sugars. Potential side effects of diarrhea and nausea. Patient informed of all information about. Referral placed to diabetic ed and scheduled with RM for follow up. RX called to [facility] in [place] per patient request.  Shanita Hardin, CMA

## 2018-03-29 ENCOUNTER — OFFICE VISIT (OUTPATIENT)
Dept: FAMILY MEDICINE | Facility: CLINIC | Age: 53
End: 2018-03-29
Payer: COMMERCIAL

## 2018-03-29 VITALS
TEMPERATURE: 97.5 F | HEART RATE: 82 BPM | HEIGHT: 71 IN | WEIGHT: 218 LBS | RESPIRATION RATE: 16 BRPM | DIASTOLIC BLOOD PRESSURE: 78 MMHG | BODY MASS INDEX: 30.52 KG/M2 | OXYGEN SATURATION: 96 % | SYSTOLIC BLOOD PRESSURE: 118 MMHG

## 2018-03-29 DIAGNOSIS — E11.9 TYPE 2 DIABETES MELLITUS WITHOUT COMPLICATION, WITHOUT LONG-TERM CURRENT USE OF INSULIN (H): Primary | ICD-10-CM

## 2018-03-29 PROCEDURE — 99213 OFFICE O/P EST LOW 20 MIN: CPT | Performed by: OBSTETRICS & GYNECOLOGY

## 2018-03-29 ASSESSMENT — PAIN SCALES - GENERAL: PAINLEVEL: NO PAIN (0)

## 2018-03-29 NOTE — MR AVS SNAPSHOT
"              After Visit Summary   3/29/2018    Ray Galdamez    MRN: [id number]           Patient Information     Date Of Birth          1965        Visit Information        Provider Department      3/29/2018 7:50 AM Berto Haq MD [facility]         Follow-ups after your visit        Your next 10 appointments already scheduled     Mar 30, 2018   Procedure with Hesham Greer MD   [facility] ([facility])    [address] [facility] [id number] [phone]            May 10, 2018  7:30 AM CDT   SHORT with Berto Haq MD   [facility]    [address] [place] [id number] [phone]              Who to contact     If you have questions or need follow up information about today's clinic visit or your schedule please contact [facility] directly at [phone].  Normal or non-critical lab and imaging results will be communicated to you by Genticelhart, letter or phone within 4 business days after the clinic has received the results. If you do not hear from us within 7 days, please contact the clinic through Genticelhart or phone. If you have a critical or abnormal lab result, we will notify you by phone as soon as possible.  Submit refill requests through [employer] or call your pharmacy and they will forward the refill request to us. Please allow 3 business days for your refill to be completed.          Additional Information About Your Visit        Genticelhart Information     [employer] Sky lets you send messages to your doctor, view your test results, renew your prescriptions, schedule appointments and more. To sign up, go to [email or web address] . Click on \"Log in\" on the left side of the screen, which will take you to the Welcome page. Then click on \"Sign up Now\" on the right side of the page.     You will be asked to enter the access code listed below, as " "well as some personal information. Please follow the directions to create your username and password.     Your access code is: [email or web address]  Expires: 2018  4:16 PM     Your access code will  in 90 days. If you need help or a new code, please call your [facility] or [phone].        Care EveryWhere ID     This is your Care EveryWhere ID. This could be used by other organizations to access your [facility] medical records  [id number]        Your Vitals Were     Pulse Temperature Respirations Height Pulse Oximetry BMI (Body Mass Index)    82 97.5  F (36.4  C) (Temporal) 16 5' 10.5\" (1.791 m) 96% 30.84 kg/m2       Blood Pressure from Last 3 Encounters:   18 118/78   18 128/80   17 126/90    Weight from Last 3 Encounters:   18 218 lb (98.9 kg)   18 217 lb (98.4 kg)   17 216 lb (98 kg)              Today, you had the following     No orders found for display       Primary Care Provider Office Phone # Fax #    [facility] [phone]       [address] [facility] [address]        Equal Access to Services     [facility]UNC[facility] YOUNG AH: Hadii viki dickeyo Sojohnali, waaxda luqadaha, qaybta kaalmada adeegyada, waxeduin valdesn eloy blevins. So [facility] [phone].    ATENCIÓN: Si habla español, tiene a steele disposición servicios gratuitos de asistencia lingüística. Llame al [phone].    We comply with applicable federal civil rights laws and [place] laws. We do not discriminate on the basis of race, color, national origin, age, disability, sex, sexual orientation, or gender identity.            Thank you!     Thank you for choosing [facility]  for your care. Our goal is always to provide you with excellent care. Hearing back from our patients is one way we can continue to improve our services. Please take a few minutes to complete the written survey that you may receive in the mail after your visit with us. Thank you!   "           Your Updated Medication List - Protect others around you: Learn how to safely use, store and throw away your medicines at www.disposemymeds.org.          This list is accurate as of 3/29/18  8:10 AM.  Always use your most recent med list.                   Brand Name Dispense Instructions for use Diagnosis    metFORMIN 500 MG tablet    GLUCOPHAGE    30 tablet    Take 1 tablet (500 mg) by mouth daily (with dinner)

## 2018-03-29 NOTE — NURSING NOTE
"Chief Complaint   Patient presents with     Diabetes       Initial /78  Pulse 82  Temp 97.5  F (36.4  C) (Temporal)  Resp 16  Ht 5' 10.5\" (1.791 m)  Wt 218 lb (98.9 kg)  SpO2 96%  BMI 30.84 kg/m2 Estimated body mass index is 30.84 kg/(m^2) as calculated from the following:    Height as of this encounter: 5' 10.5\" (1.791 m).    Weight as of this encounter: 218 lb (98.9 kg)..   BP completed using cuff size: regular  Medication Rec Completed    Shanita Hardin CMA    "

## 2018-03-29 NOTE — PROGRESS NOTES
"Subjec tive:  He is here today to discuss the results of his lab tests. This showed that he has diabetes and is likely type II. I had started him on metformin and he does not notice any side effects. He has not seen the diabetic educator. We were hoping this had been done already so that I could visit about his sugar testing. I also explained that his cholesterol is high. He has done some reading on it. He is changing his diet.      The past medical history, social history, past surgical history and family history as shown below have been reviewed by me today.  History reviewed. No pertinent past medical history.   No Known Allergies  Current Outpatient Prescriptions   Medication Sig Dispense Refill     metFORMIN (GLUCOPHAGE) 500 MG tablet Take 1 tablet (500 mg) by mouth daily (with dinner) 30 tablet 1     [DISCONTINUED] UNKNOWN TO PATIENT Took generic children's allergy medication       History reviewed. No pertinent surgical history.  Social History     Social History     Marital status:      Spouse name: N/A     Number of children: N/A     Years of education: N/A     Social History Main Topics     Smoking status: Never Smoker     Smokeless tobacco: Never Used     Alcohol use None     Drug use: None     Sexual activity: Not Asked     Other Topics Concern     None     Social History Narrative     Family History   Problem Relation Age of Onset     DIABETES Mother      DIABETES Father      Myocardial Infarction Father        ROS: A 12 point review of systems was done. Except for what is listed above in the HPI, the systems review is negative .      Objective: Vital signs: Blood pressure 118/78, pulse 82, temperature 97.5  F (36.4  C), temperature source Temporal, resp. rate 16, height 5' 10.5\" (1.791 m), weight 218 lb (98.9 kg), SpO2 96 %.    Chest is clear to auscultation.  No wheezes, rales or rhonchi heard.  Cardiac exam is normal with s1, s2, no murmurs or adventitious sounds.Normal rate and rhythm is " heard.           Assessment/Plan:A total of 20 minutes were spent face-to-face with this patient during today's consultation, with more than 50% of that time devoted to conversation and counseling about the management decisions.      1. Type 2 diabetes mellitus most likely related to insulin resistance. I advised him to lose 20 pounds at least and to do some exercising. This will definitely lower his insular resistance. We will plan to check this again in 1 month. I also advised him to make that diabetic educator appointment as soon as possible and start checking his sugars.    2. Hyperlipidemia-we will recheck lipids in about 2 months but he is changing his diet around. He may need to start cholesterol medication but will depend upon his dietary changes.    3. We discussed lisinopril. I don't want to make too many changes in medication at once so I'm going to hold off for now for probably within 2-3 months we will start lisinopril    4. He will start aspirin 81 mg enteric-coated either daily or every other day depending on if he can tolerate it without nosebleeds etc.    5. Recheck with me in 1 month. Sooner if he has any concerns.    6. I advised him to set up an appointment with an ophthalmologist    ELADIA Haq MD

## 2018-03-30 ENCOUNTER — SURGERY (OUTPATIENT)
Age: 53
End: 2018-03-30

## 2018-03-30 ENCOUNTER — HOSPITAL ENCOUNTER (OUTPATIENT)
Facility: CLINIC | Age: 53
Discharge: HOME | End: 2018-03-30
Attending: INTERNAL MEDICINE | Admitting: INTERNAL MEDICINE
Payer: COMMERCIAL

## 2018-03-30 VITALS
RESPIRATION RATE: 16 BRPM | SYSTOLIC BLOOD PRESSURE: 110 MMHG | DIASTOLIC BLOOD PRESSURE: 87 MMHG | OXYGEN SATURATION: 99 % | TEMPERATURE: 98.2 F | HEART RATE: 105 BPM

## 2018-03-30 LAB — COLONOSCOPY: NORMAL

## 2018-03-30 PROCEDURE — G0500 MOD SEDAT ENDO SERVICE >5YRS: HCPCS

## 2018-03-30 PROCEDURE — 45380 COLONOSCOPY AND BIOPSY: CPT | Performed by: INTERNAL MEDICINE

## 2018-03-30 PROCEDURE — 25000128 H RX IP 250 OP 636: Performed by: INTERNAL MEDICINE

## 2018-03-30 PROCEDURE — 25000125 ZZHC RX 250: Performed by: INTERNAL MEDICINE

## 2018-03-30 PROCEDURE — 40000296 ZZH STATISTIC ENDO RECOVERY CLASS 1:2 FIRST HOUR: Performed by: INTERNAL MEDICINE

## 2018-03-30 PROCEDURE — 88305 TISSUE EXAM BY PATHOLOGIST: CPT | Mod: 26 | Performed by: INTERNAL MEDICINE

## 2018-03-30 PROCEDURE — 88305 TISSUE EXAM BY PATHOLOGIST: CPT | Performed by: INTERNAL MEDICINE

## 2018-03-30 PROCEDURE — 25000132 ZZH RX MED GY IP 250 OP 250 PS 637: Performed by: INTERNAL MEDICINE

## 2018-03-30 RX ORDER — ONDANSETRON 2 MG/ML
4 INJECTION INTRAMUSCULAR; INTRAVENOUS
Status: DISCONTINUED | OUTPATIENT
Start: 2018-03-30 | End: 2018-03-30 | Stop reason: HOSPADM

## 2018-03-30 RX ORDER — LIDOCAINE 40 MG/G
CREAM TOPICAL
Status: DISCONTINUED | OUTPATIENT
Start: 2018-03-30 | End: 2018-03-30 | Stop reason: HOSPADM

## 2018-03-30 RX ORDER — SIMETHICONE
LIQUID (ML) MISCELLANEOUS PRN
Status: DISCONTINUED | OUTPATIENT
Start: 2018-03-30 | End: 2018-03-30 | Stop reason: HOSPADM

## 2018-03-30 RX ORDER — FENTANYL CITRATE 50 UG/ML
INJECTION, SOLUTION INTRAMUSCULAR; INTRAVENOUS PRN
Status: DISCONTINUED | OUTPATIENT
Start: 2018-03-30 | End: 2018-03-30 | Stop reason: HOSPADM

## 2018-03-30 RX ADMIN — FENTANYL CITRATE 25 MCG: 50 INJECTION, SOLUTION INTRAMUSCULAR; INTRAVENOUS at 10:41

## 2018-03-30 RX ADMIN — FENTANYL CITRATE 50 MCG: 50 INJECTION, SOLUTION INTRAMUSCULAR; INTRAVENOUS at 10:35

## 2018-03-30 RX ADMIN — FENTANYL CITRATE 25 MCG: 50 INJECTION, SOLUTION INTRAMUSCULAR; INTRAVENOUS at 10:39

## 2018-03-30 RX ADMIN — MIDAZOLAM 1 MG: 1 INJECTION INTRAMUSCULAR; INTRAVENOUS at 10:47

## 2018-03-30 RX ADMIN — MIDAZOLAM 1 MG: 1 INJECTION INTRAMUSCULAR; INTRAVENOUS at 10:36

## 2018-03-30 RX ADMIN — LIDOCAINE HYDROCHLORIDE 1 ML: 10 INJECTION, SOLUTION EPIDURAL; INFILTRATION; INTRACAUDAL; PERINEURAL at 10:09

## 2018-03-30 RX ADMIN — MIDAZOLAM 2 MG: 1 INJECTION INTRAMUSCULAR; INTRAVENOUS at 10:35

## 2018-03-30 RX ADMIN — MIDAZOLAM 1 MG: 1 INJECTION INTRAMUSCULAR; INTRAVENOUS at 10:37

## 2018-03-30 RX ADMIN — Medication 0.2 ML: at 10:39

## 2018-03-30 RX ADMIN — MIDAZOLAM 1 MG: 1 INJECTION INTRAMUSCULAR; INTRAVENOUS at 10:38

## 2018-03-30 NOTE — CONSULTS
[facility] GI Pre-Procedure Physical Assessment    Ray Galdamez MRN# [id number]   Age: 52 year old YOB: 1965      Date of Surgery: 3/30/2018  Location [facility]      Date of Exam 3/30/2018 Facility (Same day)       Home clinic: [facility]  Primary care provider: [facility], [place] [facility]         Active problem list:   Patient Active Problem List   Diagnosis     CARDIOVASCULAR SCREENING; LDL GOAL LESS THAN 160            Medications (include herbals and vitamins):   Any Plavix use in the last 7 days?  No     Current Facility-Administered Medications   Medication     lidocaine 1 % 1 mL     lidocaine (LMX4) kit     sodium chloride (PF) 0.9% PF flush 3 mL     sodium chloride (PF) 0.9% PF flush 3 mL     ondansetron (ZOFRAN) injection 4 mg             Allergies:    No Known Allergies  Allergy to Latex?  No  Allergy to tape?    No          Social History:     Social History   Substance Use Topics     Smoking status: Never Smoker     Smokeless tobacco: Never Used     Alcohol use Not on file            Physical Exam:   All vitals have been reviewed  Blood pressure 132/88, pulse 105, temperature 98.2  F (36.8  C), temperature source Oral, resp. rate 20, SpO2 94 %.  Airway assessment:   Patient is able to open mouth wide  Patient is able to stick out tongue      Lungs:   No increased work of breathing, good air exchange, clear to auscultation bilaterally, no crackles or wheezing      Cardiovascular:   Normal apical impulse, regular rate and rhythm, normal S1 and S2, no S3 or S4, and no murmur noted           Lab / Radiology Results:   All laboratory data reviewed          Assessment:   Appropriately NPO  Chief complaint or anatomic assessment of involved area: screen         Plan:   Moderate (conscious) sedation     Patient's active problems diagnostically and therapeutically optimized for the planned procedure  Risks, benefits, alternatives to sedation and blood  explained and consent obtained  Risks, benefits, alternatives to procedure explained and consent obtained  Orders and progress notes are in the chart  Discharge from Phase 1 and / or Phase 2 recovery when patient meets criteria    I have reviewed the history and physical, lab finding(s), diagnostic data, medicaitons, and the plan for sedation.  I have determined this patient to be an appropriate candidate for the planned sedation / procedure and have reassessed the patient immediately prior to sedation / procedure.    I have personally and medically directed the administration of medications used.    Hesham Greer MD

## 2018-04-04 ENCOUNTER — OFFICE VISIT (OUTPATIENT)
Dept: SLEEP MEDICINE | Facility: CLINIC | Age: 53
End: 2018-04-04
Attending: OBSTETRICS & GYNECOLOGY
Payer: COMMERCIAL

## 2018-04-04 ENCOUNTER — ALLIED HEALTH/NURSE VISIT (OUTPATIENT)
Dept: EDUCATION SERVICES | Facility: CLINIC | Age: 53
End: 2018-04-04
Payer: COMMERCIAL

## 2018-04-04 VITALS
OXYGEN SATURATION: 96 % | RESPIRATION RATE: 16 BRPM | BODY MASS INDEX: 30.07 KG/M2 | HEART RATE: 80 BPM | SYSTOLIC BLOOD PRESSURE: 118 MMHG | WEIGHT: 214.8 LBS | HEIGHT: 71 IN | DIASTOLIC BLOOD PRESSURE: 62 MMHG

## 2018-04-04 DIAGNOSIS — E11.9 DIABETES MELLITUS (H): Primary | ICD-10-CM

## 2018-04-04 DIAGNOSIS — G47.33 OSA (OBSTRUCTIVE SLEEP APNEA): ICD-10-CM

## 2018-04-04 DIAGNOSIS — R06.83 SNORING: ICD-10-CM

## 2018-04-04 LAB — COPATH REPORT: NORMAL

## 2018-04-04 PROCEDURE — G0108 DIAB MANAGE TRN  PER INDIV: HCPCS

## 2018-04-04 PROCEDURE — 99244 OFF/OP CNSLTJ NEW/EST MOD 40: CPT | Performed by: PHYSICIAN ASSISTANT

## 2018-04-04 NOTE — MR AVS SNAPSHOT
After Visit Summary   4/4/2018    Ray Galdamez    MRN: [id number]           Patient Information     Date Of Birth          1965        Visit Information        Provider Department      4/4/2018 10:00 AM NL DIABETIC ED RESOURCE [facility]        Today's Diagnoses     Diabetes mellitus (H)    -  1      Care Instructions    My Diabetes Care Goals:    Monitoring: I will check blood sugar before breakfast and one other time of day that you choose.     Follow up:  Follow-up diabetes education appointment scheduled on 4/25.      Bring blood glucose meter and logbook with you to all doctor and follow-up appointments.     [facility] for the [facility]:  For Your Diabetes Education and Nutrition Appointments Call:  [phone]   For Diabetes Education or Nutrition Related Questions:   Phone: [phone]  E-mail: [email or web address]  Fax: [phone]   If you need a medication refill please contact your pharmacy. Please allow 3 business days for your refills to be completed.    Instructions for emailing the Diabetes Educators    If you need to communicate a non-urgent message to a Diabetes Educator via email, please send to [email or web address].    Please follow the following email guidelines:    Subject line: Secure: your clinic name (example: Secure: [facility])  In the email please include: First name, middle initial, last name and date of birth.    We will be in touch with you within one (1) business day.             Follow-ups after your visit        Your next 10 appointments already scheduled     Apr 12, 2018  4:00 PM CDT   HST  with PH BED 3   [facility] ([facility])    [address] [place] [id number] [phone]            Apr 13, 2018  6:30 AM CDT   HST Drop Off with PH BED 3   [facility] ([facility])  "   [address] [facility] [address]   [phone]            Apr 20, 2018  4:00 PM CDT   Return Sleep Patient with Dann Huertas PA-C   [place] [facility]    [address] [facility] [place] [address]   [phone]            Apr 25, 2018  4:00 PM CDT   Diabetic Education with NL DIABETIC ED RESOURCE   [place] [facility] ([facility])    [address] [place] [address]  [place] [address]   [phone]            May 10, 2018  7:30 AM CDT   SHORT with Berto Haq MD   [facility]    [address] [place] [address]   [phone]              Future tests that were ordered for you today     Open Future Orders        Priority Expected Expires Ordered    HST-Home Sleep Apnea Test Routine  10/4/2018 4/4/2018            Who to contact     If you have questions or need follow up information about today's clinic visit or your schedule please contact [facility] directly at [phone].  Normal or non-critical lab and imaging results will be communicated to you by MyChart, letter or phone within 4 business days after the clinic has received the results. If you do not hear from us within 7 days, please contact the clinic through [employer] or phone. If you have a critical or abnormal lab result, we will notify you by phone as soon as possible.  Submit refill requests through [employer] or call your pharmacy and they will forward the refill request to us. Please allow 3 business days for your refill to be completed.          Additional Information About Your Visit        [employer] Information     [employer] lets you send messages to your doctor, view your test results, renew your prescriptions, schedule appointments and more. To sign up, go to [email or web address] . Click on \"Log in\" on the left side of the screen, which will take you to " "the Welcome page. Then click on \"Sign up Now\" on the right side of the page.     You will be asked to enter the access code listed below, as well as some personal information. Please follow the directions to create your username and password.     Your access code is: [email or web address]  Expires: 2018  4:16 PM     Your access code will  in 90 days. If you need help or a new code, please call your [place] clinic or [phone].        Care EveryWhere ID     This is your Care EveryWhere ID. This could be used by other organizations to access your [place] medical records  [id number]         Blood Pressure from Last 3 Encounters:   18 118/62   18 110/87   18 118/78    Weight from Last 3 Encounters:   18 97.4 kg (214 lb 12.8 oz)   18 98.9 kg (218 lb)   18 98.4 kg (217 lb)              Today, you had the following     No orders found for display         Today's Medication Changes          These changes are accurate as of 18 10:52 AM.  If you have any questions, ask your nurse or doctor.               Start taking these medicines.        Dose/Directions    blood glucose monitoring lancets   Used for:  Diabetes mellitus (H)        Use to test blood sugar twice daily   Quantity:  100 each   Refills:  11       blood glucose monitoring test strip   Commonly known as:  ONETOUCH VERIO IQ   Used for:  Diabetes mellitus (H)        Use to test blood sugars 2 times daily or as directed.   Quantity:  100 each   Refills:  prn            Where to get your medicines      [facility] [place], [place] - [address]  [address], [facility] [address]     Phone:  [phone]     blood glucose monitoring lancets    blood glucose monitoring test strip                Primary Care Provider Office Phone # Fax #    [place] [facility] [phone]       [address] [place] [address]        Equal Access to Services     ASUNCION YOUNG AH: " Hadii viki oswald Sojohnali, waaxda luqadaha, qaybta kaalsusie samson, jose manuel evanin hayaalee caglebayron reich lamooklee gen. So [facility] [phone].    ATENCIÓN: Si habla kaushal, tiene a steele disposición servicios gratuitos de asistencia lingüística. Llame al [phone].    We comply with applicable federal civil rights laws and [place] laws. We do not discriminate on the basis of race, color, national origin, age, disability, sex, sexual orientation, or gender identity.            Thank you!     Thank you for choosing [facility] [place]  for your care. Our goal is always to provide you with excellent care. Hearing back from our patients is one way we can continue to improve our services. Please take a few minutes to complete the written survey that you may receive in the mail after your visit with us. Thank you!             Your Updated Medication List - Protect others around you: Learn how to safely use, store and throw away your medicines at www.disposemymeds.org.          This list is accurate as of 4/4/18 10:52 AM.  Always use your most recent med list.                   Brand Name Dispense Instructions for use Diagnosis    blood glucose monitoring lancets     100 each    Use to test blood sugar twice daily    Diabetes mellitus (H)       blood glucose monitoring test strip    ONETOUCH VERIO IQ    100 each    Use to test blood sugars 2 times daily or as directed.    Diabetes mellitus (H)       metFORMIN 500 MG tablet    GLUCOPHAGE    30 tablet    Take 1 tablet (500 mg) by mouth daily (with dinner)

## 2018-04-04 NOTE — MR AVS SNAPSHOT
"              After Visit Summary   4/4/2018    Ray Galdamez    MRN: [id number]           Patient Information     Date Of Birth          1965        Visit Information        Provider Department      4/4/2018 8:00 AM Dann Huertas PA-C [facility] [place]        Today's Diagnoses     PAULA (obstructive sleep apnea)        Snoring          Care Instructions    MY TREATMENT INFORMATION FOR SLEEP APNEA-  Ray Galdamez    DOCTOR : Dann Huertas  SLEEP CENTER :      MY CONTACT NUMBER:     Am I having a sleep study at a sleep center?  Make sure you have an appointment for the study before you leave!    Am I having a home sleep study?  Watch this video:  https://www.[employer].com/watch?v=CteI_GhyP9g&list=PLC4F_[email or web address]    Frequently asked questions:  1. What is Obstructive Sleep Apnea (PAULA)? PAULA is the most common type of sleep apnea. Apnea means, \"without breath.\"  Apnea is most often caused by narrowing or collapse of the upper airway as muscles relax during sleep.   Almost everyone has occasional apneas. Most people with sleep apnea have had brief interruptions at night frequently for many years.  The severity of sleep apnea is related to how frequent and severe the events are.   2. What are the consequences of PAULA? Symptoms include: feeling sleepy during the day, snoring loudly, gasping or stopping of breathing, trouble sleeping, and occasionally morning headaches or heartburn at night.  Sleepiness can be serious and even increase the risk of falling asleep while driving. Other health consequences may include development of high blood pressure and other cardiovascular disease in persons who are susceptible. Untreated PAULA  can contribute to heart disease, stroke and diabetes.   3. What are the treatment options? In most situations, sleep apnea is a lifelong disease that must be managed with daily therapy. Medications are not effective for sleep apnea and surgery is " generally not considered until other therapies have been tried. Your treatment is your choice . Continuous Positive Airway (CPAP) works right away and is the therapy that is effective in nearly everyone. An oral device to hold your jaw forward is usually the next most reliable option. Other options include postioning devices (to keep you off your back), weight loss, and surgery including a tongue pacing device. There is more detail about some of these options below.    Important tips for using CPAP and similar devices   Know your equipment:  CPAP is continuous positive airway pressure that prevents obstructive sleep apnea by keeping the throat from collapsing while you are sleeping. In most cases, the device is  smart  and can slowly self-adjusts if your throat collapses and keeps a record every day of how well you are treated-this information is available to you and your care team.  BPAP is bilevel positive airway pressure that keeps your throat open and also assists each breath with a pressure boost to maintain adequate breathing.  Special kinds of BPAP are used in patients who have inadequate breathing from lung or heart disease. In most cases, the device is  smart  and can slowly self-adjusts to assist breathing. Like CPAP, the device keeps a record of how well you are treated.  Your mask is your connection to the device. You get to choose what feels most comfortable and the staff will help to make sure if fits. Here: are some examples of the different masks that are available:       Key points to remember on your journey with sleep apnea:  1. Sleep study.  PAP devices often need to be adjusted during a sleep study to show that they are effective and adjusted right.  2. Good tips to remember: Try wearing just the mask during a quiet time during the day so your body adapts to wearing it. A humidifier is recommended for comfort in most cases to prevent drying of your nose and throat. Allergy medication from your  provider may help you if you are having nasal congestion.  3. Getting settled-in. It takes more than one night for most of us to get used to wearing a mask. Try wearing just the mask during a quiet time during the day so your body adapts to wearing it. A humidifier is recommended for comfort in most cases. Our team will work with you carefully on the first day and will be in contact within 4 days and again at 2 and 4 weeks for advice and remote device adjustments. Your therapy is evaluated by the device each day.   4. Use it every night. The more you are able to sleep naturally for 7-8 hours, the more likely you will have good sleep and to prevent health risks or symptoms from sleep apnea. Even if you use it 4 hours it helps. Occasionally all of us are unable to use a medical therapy, in sleep apnea, it is not dangerous to miss one night.   5. Communicate. Call our skilled team on the number provided on the first day if your visit for problems that make it difficult to wear the device. Over 2 out of 3 patients can learn to wear the device long-term with help from our team. Remember to call our team or your sleep providers if you are unable to wear the device as we may have other solutions for those who cannot adapt to mask CPAP therapy. It is recommended that you sleep your sleep provider within the first 3 months and yearly after that if you are not having problems.   Take care of your equipment. Make sure you clean your mask and tubing using directions every day and that your filter and mask are replaced as recommended or if they are not working.     BESIDES CPAP, WHAT OTHER THERAPIES ARE THERE?    Positioning Device  Positioning devices are generally used when sleep apnea is mild and only occurs on your back.This example shows a pillow that straps around the waist. It may be appropriate for those whose sleep study shows milder sleep apnea that occurs primarily when lying flat on one's back. Preliminary studies  have shown benefit but effectiveness at home may need to be verified by a home sleep test. These devices are generally not covered by medical insurance.  Examples of devices that maintain sleeping on the back to prevent snoring and mild sleep apnea.    Belt type body positioner  Http://[employer].Texert/    Electronic reminder  Http://nightshifttherapy.com/  Http://www.MessageGearspod.com.au/    Oral Appliance  What is oral appliance therapy?  An oral appliance device fits on your teeth at night like a retainer used after having braces. The device is made by a specialized dentist and requires several visits over 1-2 months before a manufactured device is made to fit your teeth and is adjusted to prevent your sleep apnea. Once an oral device is working properly, snoring should be improved. A home sleep test may be recommended at that time if to determine whether the sleep apnea is adequately treated.       Some things to remember:  -Oral devices are often, but not always, covered by your medical insurance. Be sure to check with your insurance provider.   -If you are referred for oral therapy, you will be given a list of specialized dentists to consider or you may choose to visit the Web site of the American Academy of Dental Sleep Medicine  -Oral devices are less likely to work if you have severe sleep apnea or are extremely overweight.     More detailed information  An oral appliance is a small acrylic device that fits over the upper and lower teeth  (similar to a retainer or a mouth guard). This device slightly moves jaw forward, which moves the base of the tongue forward, opens the airway, improves breathing for effective treat snoring and obstructive sleep apnea in perhaps 7 out of 10 people .  The best working devices are custom-made by a dental device  after a mold is made of the teeth 1, 2, 3.  When is an oral appliance indicated?  Oral appliance therapy is recommended as a first-line treatment for patients  with primary snoring, mild sleep apnea, and for patients with moderate sleep apnea who prefer appliance therapy to use of CPAP4, 5. Severity of sleep apnea is determined by sleep testing and is based on the number of respiratory events per hour of sleep.   How successful is oral appliance therapy?  The success rate of oral appliance therapy in patients with mild sleep apnea is 75-80% while in patients with moderate sleep apnea it is 50-70%. The chance of success in patients with severe sleep apnea is 40-50%. The research also shows that oral appliances have a beneficial effect on the cardiovascular health of PAULA patients at the same magnitude as CPAP therapy7.  Oral appliances should be a second-line treatment in cases of severe sleep apnea, but if not completely successful then a combination therapy utilizing CPAP plus oral appliance therapy may be effective. Oral appliances tend to be effective in a broad range of patients although studies show that the patients who have the highest success are females, younger patients, those with milder disease, and less severe obesity. 3, 6.   Finding a dentist that practices dental sleep medicine  Specific training is available through the American Academy of Dental Sleep Medicine for dentists interested in working in the field of sleep. To find a dentist who is educated in the field of sleep and the use of oral appliances, near you, visit the Web site of the American Academy of Dental Sleep Medicine.    References  1. Verito et al. Objectively measured vs self-reported compliance during oral appliance therapy for sleep-disordered breathing. Chest 2013; 144(5): 3206-2590.  2. Yordan, et al. Objective measurement of compliance during oral appliance therapy for sleep-disordered breathing. Thorax 2013; 68(1): 91-96.  3. Sarah et al. Mandibular advancement devices in 620 men and women with PAULA and snoring: tolerability and predictors of treatment success. Chest 2004;  125: 6693-5943.  4. Geoffrey et al. Oral appliances for snoring and PAULA: a review. Sleep 2006; 29: 244-262.  5. Anil et al. Oral appliance treatment for PAULA: an update. J Clin Sleep Med 2014; 10(2): 215-227.  6. Madiha et al. Predictors of OSAH treatment outcome. J Dent Res 2007; 86: 7984-0506.      Weight Loss:    Weight loss is a long-term strategy that may improve sleep apnea in some patients.    Weight management is a personal decision and the decision should be based on your interest and the potential benefits.  If you are interested in exploring weight loss strategies, the following discussion covers the impact on weight loss on sleep apnea and the approaches that may be successful.    Being overweight does not necessarily mean you will have health consequences.  Those who have BMI over 35 or over 27 with existing medical conditions carries greater risk.   Weight loss decreases severity of sleep apnea in most people with obesity. For those with mild obesity who have developed snoring with weight gain, even 15-30 pound weight loss can improve and occasionally eliminate sleep apnea.  Structured and life-long dietary and health habits are necessary to lose weight and keep healthier weight levels.     Though there may be significant health benefits from weight loss, long-term weight loss is very difficult to achieve- studies show success with dietary management in less than 10% of people. In addition, substantial weight loss may require years of dietary control and may be difficult if patients have severe obesity. In these cases, surgical management may be considered.  Finally, older individuals who have tolerated obesity without health complications may be less likely to benefit from weight loss strategies.        Your BMI is Body mass index is 30.38 kg/(m^2).  Weight management is a personal decision.  If you are interested in exploring weight loss strategies, the following discussion covers the  approaches that may be successful. Body mass index (BMI) is one way to tell whether you are at a healthy weight, overweight, or obese. It measures your weight in relation to your height.  A BMI of 18.5 to 24.9 is in the healthy range. A person with a BMI of 25 to 29.9 is considered overweight, and someone with a BMI of 30 or greater is considered obese. More than two-thirds of American adults are considered overweight or obese.  Being overweight or obese increases the risk for further weight gain. Excess weight may lead to heart disease and diabetes.  Creating and following plans for healthy eating and physical activity may help you improve your health.  Weight control is part of healthy lifestyle and includes exercise, emotional health, and healthy eating habits. Careful eating habits lifelong are the mainstay of weight control. Though there are significant health benefits from weight loss, long-term weight loss with diet alone may be very difficult to achieve- studies show long-term success with dietary management in less than 10% of people. Attaining a healthy weight may be especially difficult to achieve in those with severe obesity. In some cases, medications, devices and surgical management might be considered.  What can you do?  If you are overweight or obese and are interested in methods for weight loss, you should discuss this with your provider.     Consider reducing daily calorie intake by 500 calories.     Keep a food journal.     Avoiding skipping meals, consider cutting portions instead.    Diet combined with exercise helps maintain muscle while optimizing fat loss. Strength training is particularly important for building and maintaining muscle mass. Exercise helps reduce stress, increase energy, and improves fitness. Increasing exercise without diet control, however, may not burn enough calories to loose weight.       Start walking three days a week 10-20 minutes at a time    Work towards walking  thirty minutes five days a week     Eventually, increase the speed of your walking for 1-2 minutes at time    In addition, we recommend that you review healthy lifestyles and methods for weight loss available through the National Institutes of Health patient information sites:  http://win.niddk.nih.gov/publications/index.htm    And look into health and wellness programs that may be available through your health insurance provider, employer, local community center, or norma club.    Weight management plan: Patient was referred to their PCP to discuss a diet and exercise plan.      Surgery:    Surgery for obstructive sleep apnea is considered generally only when other therapies fail to work. Surgery may be discussed with you if you are having a difficult time tolerating CPAP and or when there is an abnormal structure that requires surgical correction.  Nose and throat surgeries often enlarge the airway to prevent collapse.  Most of these surgeries create pain for 1-2 weeks and up to half of the most common surgeries are not effective throughout life.  You should carefully discuss the benefits and drawbacks to surgery with your sleep provider and surgeon to determine if it is the best solution for you.   More information  Surgery for PAULA is directed at areas that are responsible for narrowing or complete obstruction of the airway during sleep.  There are a wide range of procedures available to enlarge and/or stabilize the airway to prevent blockage of breathing in the three major areas where it can occur: the palate, tongue, and nasal regions.  Successful surgical treatment depends on the accurate identification of the factors responsible for obstructive sleep apnea in each person.  A personalized approach is required because there is no single treatment that works well for everyone.  Because of anatomic variation, consultation with an examination by a sleep surgeon is a critical first step in determining what surgical  options are best for each patient.  In some cases, examination during sedation may be recommended in order to guide the selection of procedures.  Patients will be counseled about risks and benefits as well as the typical recovery course after surgery. Surgery is typically not a cure for a person s PAULA.  However, surgery will often significantly improve one s PAULA severity (termed  success rate ).  Even in the absence of a cure, surgery will decrease the cardiovascular risk associated with OSA7; improve overall quality of life8 (sleepiness, functionality, sleep quality, etc).      Palate Procedures:  Patients with PAULA often have narrowing of their airway in the region of their tonsils and uvula.  The goals of palate procedures are to widen the airway in this region as well as to help the tissues resist collapse.  Modern palate procedure techniques focus on tissue conservation and soft tissue rearrangement, rather than tissue removal.  Often the uvula is preserved in this procedure. Residual sleep apnea is common in patient after pharyngoplasty with an average reduction in sleep apnea events of 33%2.      Tongue Procedures:  ExamWhile patients are awake, the muscles that surround the throat are active and keep this region open for breathing. These muscles relax during sleep, allowing the tongue and other structures to collapse and block breathing.  There are several different tongue procedures available.  Selection of a tongue base procedure depends on characteristics seen on physical exam.  Generally, procedures are aimed at removing bulky tissues in this area or preventing the back of the tongue from falling back during sleep.  Success rates for tongue surgery range from 50-62%3.    Hypoglossal Nerve Stimulation:  Hypoglossal nerve stimulation has recently received approval from the [place] Food and Drug Administration for the treatment of obstructive sleep apnea.  This is based on research showing that the  system was safe and effective in treating sleep apnea6.  Results showed that the median AHI score decreased 68%, from 29.3 to 9.0. This therapy uses an implant system that senses breathing patterns and delivers mild stimulation to airway muscles, which keeps the airway open during sleep.  The system consists of three fully implanted components: a small generator (similar in size to a pacemaker), a breathing sensor, and a stimulation lead.  Using a small handheld remote, a patient turns the therapy on before bed and off upon awakening.    Candidates for this device must be greater than 22 years of age, have moderate to severe PAULA (AHI between 20-65), BMI less than 32, have tried CPAP/oral appliance without success, and have appropriate upper airway anatomy (determined by a sleep endoscopy performed by Dr. Davis).    Hypoglossal Nerve Stimulation Pathway:    The sleep surgeon s office will work with the patient through the insurance prior-authorization process (including communications and appeals).    Nasal Procedures:  Nasal obstruction can interfere with nasal breathing during the day and night.  Studies have shown that relief of nasal obstruction can improve the ability of some patients to tolerate positive airway pressure therapy for obstructive sleep apnea1.  Treatment options include medications such as nasal saline, topical corticosteroid and antihistamine sprays, and oral medications such as antihistamines or decongestants. Non-surgical treatments can include external nasal dilators for selected patients. If these are not successful by themselves, surgery can improve the nasal airway either alone or in combination with these other options.      Combination Procedures:  Combination of surgical procedures and other treatments may be recommended, particularly if patients have more than one area of narrowing or persistent positional disease.  The success rate of combination surgery ranges from 66-80%2,3.     References  1. Katt MORALES. The Role of the Nose in Snoring and Obstructive Sleep Apnoea: An Update.  Eur Arch Otorhinolaryngol. 2011; 268: 1365-73.  2.  Kevin SM; Kyrie JA; Dianne JR; Pallanch JF; Yobany MB; Kimmy SG; Bhavin GOODMAN. Surgical modifications of the upper airway for obstructive sleep apnea in adults: a systematic review and meta-analysis. SLEEP 2010;33(10):5525-9205. Kendall BLANC. Hypopharyngeal surgery in obstructive sleep apnea: an evidence-based medicine review.  Arch Otolaryngol Head Neck Surg. 2006 Feb;132(2):206-13.  3. Javier YH1, Hope Y, Andrew ANTONIO. The efficacy of anatomically based multilevel surgery for obstructive sleep apnea. Otolaryngol Head Neck Surg. 2003 Oct;129(4):327-35.  4. Kendall BLANC, Goldberg A. Hypopharyngeal Surgery in Obstructive Sleep Apnea: An Evidence-Based Medicine Review. Arch Otolaryngol Head Neck Surg. 2006 Feb;132(2):206-13.  5. Carlos Enrique PJ et al. Upper-Airway Stimulation for Obstructive Sleep Apnea.  N Engl J Med. 2014 Jan 9;370(2):139-49.  6. Jose Enrique Y et al. Increased Incidence of Cardiovascular Disease in Middle-aged Men with Obstructive Sleep Apnea. Am J Respir Crit Care Med; 2002 166: 159-165  7. Sanford EM et al. Studying Life Effects and Effectiveness of Palatopharyngoplasty (SLEEP) study: Subjective Outcomes of Isolated Uvulopalatopharyngoplasty. Otolaryngol Head Neck Surg. 2011; 144: 623-631.                    Follow-ups after your visit        Follow-up notes from your care team     Return in about 2 weeks (around 4/18/2018).      Your next 10 appointments already scheduled     Apr 04, 2018 10:00 AM CDT   Diabetic Education with [facility] DIABETIC ED RESOURCE   [facility] ([facility])    [address] [place] [address]   [phone]            May 10, 2018  7:30 AM CDT   SHORT with Berto Haq MD   [facility] ([facility]    [address] [address]  "  [phone]              Who to contact     If you have questions or need follow up information about today's clinic visit or your schedule please contact [facility] directly at [phone].  Normal or non-critical lab and imaging results will be communicated to you by MyChart, letter or phone within 4 business days after the clinic has received the results. If you do not hear from us within 7 days, please contact the clinic through MyChart or phone. If you have a critical or abnormal lab result, we will notify you by phone as soon as possible.  Submit refill requests through [employer] or call your pharmacy and they will forward the refill request to us. Please allow 3 business days for your refill to be completed.          Additional Information About Your Visit        Le[employer]ra mus[employer]ar[employer] Information     Starline lets you send messages to your doctor, view your test results, renew your prescriptions, schedule appointments and more. To sign up, go to [email or web address] . Click on \"Log in\" on the left side of the screen, which will take you to the Welcome page. Then click on \"Sign up Now\" on the right side of the page.     You will be asked to enter the access code listed below, as well as some personal information. Please follow the directions to create your username and password.     Your access code is: [email or web address]  Expires: 2018  4:16 PM     Your access code will  in 90 days. If you need help or a new code, please call your [place] clinic or [phone].        Care EveryWhere ID     This is your Care EveryWhere ID. This could be used by other organizations to access your [place] medical records  [id number]        Your Vitals Were     Pulse Respirations Height Pulse Oximetry BMI (Body Mass Index)       80 16 1.791 m (5' 10.5\") 96% 30.38 kg/m2        Blood Pressure from Last 3 Encounters:   18 118/62   18 110/87   18 118/78    Weight from Last 3 Encounters: "   04/04/18 97.4 kg (214 lb 12.8 oz)   03/29/18 98.9 kg (218 lb)   03/21/18 98.4 kg (217 lb)              We Performed the Following     SLEEP EVALUATION & MANAGEMENT REFERRAL - ADULT -[facility] [place] [phone] (Age 13 if over 100 lbs)        Primary Care Provider Office Phone # Fax #    [facility] [phone]       [address] [address]        Equal Access to Services     ASUNCION YOUNG : Hadii aad ku hadasho Soomaali, waaxda luqadaha, qaybta kaalmada adeegyada, waxay idiin hayaan eloy domingo . So [facility] [place] [phone].    ATENCIÓN: Si habla español, tiene a steele disposición servicios gratuitos de asistencia lingüística. Llame al [phone].    We comply with applicable federal civil rights laws and [place] laws. We do not discriminate on the basis of race, color, national origin, age, disability, sex, sexual orientation, or gender identity.            Thank you!     Thank you for choosing [facility]  for your care. Our goal is always to provide you with excellent care. Hearing back from our patients is one way we can continue to improve our services. Please take a few minutes to complete the written survey that you may receive in the mail after your visit with us. Thank you!             Your Updated Medication List - Protect others around you: Learn how to safely use, store and throw away your medicines at www.disposemymeds.org.          This list is accurate as of 4/4/18  9:02 AM.  Always use your most recent med list.                   Brand Name Dispense Instructions for use Diagnosis    metFORMIN 500 MG tablet    GLUCOPHAGE    30 tablet    Take 1 tablet (500 mg) by mouth daily (with dinner)

## 2018-04-04 NOTE — Clinical Note
Initial diabetes education today. Blood sugars appear to be coming down nicely as today was 172 after breakfast. Meter instructed today and will follow up in 3 weeks for follow up education and evaluation of blood sugars.   Mattie Acosta RDN, LD, CDE

## 2018-04-04 NOTE — PROGRESS NOTES
Sleep Consultation:    Date on this visit: 4/4/2018    Ray Galdamez is sent by Berto Haq for a sleep consultation regarding snoring, poor sleep, EDS..    Primary Physician: Clinic, [facility]     Ray Galdamez reports nightly snoring and snorting for many years.     Ray goes to sleep at 9:00 PM during the week. He wakes up at 5:00 AM with an alarm. He falls asleep in 10 minutes.  Ray denies difficulty falling asleep.  He wakes up 5-8 times a night for 20 minutes before falling back to sleep.  Ray wakes up to go to the bathroom.  On weekends, Ray goes to sleep at 10:00 PM.  He wakes up at 7:00 PM without an alarm. He falls asleep in 10 minutes.  Patient gets an average of 6-7 hours of sleep per night.     Patient does use electronics in bed and watch TV in bed.     Ray does not do shift work.  He works day shifts.      Ray does snore every night. Patient does have a regular bed partner. There is report of snoring and snorting.  He does not have witnessed apneas. They occasionally sleep separately.  Patient sleeps on his back and side. He has occasional snort arousals and morning dry mouth,. Ray has occasional bruxism.    He denies sleep walking, sleep talking and sleep terrors as a child.  Ray has heartburn.      Ray has gained 0-5 pounds in the last year.  Patient describes themself as a morning person.  He would prefer to go to sleep at 9:00 PM and wake up at 6:00 AM.  Patient's [place] Sleepiness score 11/24 consistent with mild daytime sleepiness.      Ray naps 3-4 times per week for 20-30 minutes, feels refreshed after naps. He takes some inadvertant naps.  He denies closing eyes, dozing and falling asleep while driving.Patient was counseled on the importance of driving while alert, to pull over if drowsy, or nap before getting into the vehicle if sleepy.  He uses no caffeine.     Allergies:    No Known Allergies    Medications:    Current  Outpatient Prescriptions   Medication Sig Dispense Refill     metFORMIN (GLUCOPHAGE) 500 MG tablet Take 1 tablet (500 mg) by mouth daily (with dinner) 30 tablet 1     [DISCONTINUED] UNKNOWN TO PATIENT Took generic children's allergy medication         Problem List:  Patient Active Problem List    Diagnosis Date Noted     CARDIOVASCULAR SCREENING; LDL GOAL LESS THAN 160 05/10/2012     Priority: Medium        Past Medical/Surgical History:  No past medical history on file.  Past Surgical History:   Procedure Laterality Date     COLONOSCOPY N/A 3/30/2018    Procedure: COMBINED COLONOSCOPY, SINGLE OR MULTIPLE BIOPSY/POLYPECTOMY BY BIOPSY;  Colonoscopy, Polypectomy by Biopsy;  Surgeon: Hesham Greer MD;  Location:  GI       Social History:  Social History     Social History     Marital status:      Spouse name: N/A     Number of children: N/A     Years of education: N/A     Occupational History     Not on file.     Social History Main Topics     Smoking status: Never Smoker     Smokeless tobacco: Never Used     Alcohol use Not on file     Drug use: Not on file     Sexual activity: Not on file     Other Topics Concern     Not on file     Social History Narrative       Family History:  Family History   Problem Relation Age of Onset     DIABETES Mother      DIABETES Father      Myocardial Infarction Father      DIABETES Sister      DIABETES Son        Review of Systems:  A complete review of systems reviewed by me is negative with the exeption of what has been mentioned in the history of present illness.  CONSTITUTIONAL: NEGATIVE for weight gain/loss, fever, chills, sweats or night sweats, drug allergies.  EYES:  POSITIVE for changes in vision  ENT:  POSITIVE for  post-nasal drip and runny nose  CARDIAC: NEGATIVE for fast heartbeats or fluttering in chest, chest pain or pressure, breathlessness when lying flat, swollen legs or swollen feet.  NEUROLOGIC:  POSITIVE for  weakness or numbness in the arms or  "legs  DERMATOLOGIC:  POSITIVE for  new mole(s)  PULMONARY:  POSITIVE for  SOB with activity  GASTROINTESTINAL: NEGATIVE for nausea or vomitting, loose or watery stools, fat or grease in stools, constipation, abdominal pain, bowel movements black in color or blood noted.  GENITOURINARY: NEGATIVE for pain during urination, blood in urine, urinating more frequently than usual, irregular menstrual periods.  MUSCULOSKELETAL:  POSITIVE for  muscle pain and bone or joint pain  ENDOCRINE:  POSITIVE for  increased thirst and diabetes (new diagnosis)  LYMPHATIC: NEGATIVE for swollen lymph nodes, lumps or bumps in the breasts or nipple discharge.    Physical Examination:  Vitals: /62  Pulse 80  Resp 16  Ht 1.791 m (5' 10.5\")  Wt 97.4 kg (214 lb 12.8 oz)  SpO2 96%  BMI 30.38 kg/m2  BMI= Body mass index is 30.38 kg/(m^2).    Neck Cir (cm): 42 cm  Stop bang= 4    No flowsheet data found.    GENERAL APPEARANCE: healthy, alert and no distress  HENT: nose and mouth without ulcers or lesions, oropharynx crowded and nasal mucosa edematous without rhinorrhea  NECK: no adenopathy, no asymmetry, masses, or scars, thyroid normal to palpation and trachea midline and normal to palpation  RESP: lungs clear to auscultation - no rales, rhonchi or wheezes  CV: regular rates and rhythm, normal S1 S2, no S3 or S4 and no murmur, click or rub  MS: extremities normal- no gross deformities noted  PSYCH: mentation appears normal and affect normal/bright  Mallampati Class: II.  Tonsillar Stage: 1  hidden by pillars.    Impression/Plan: After appointment it was noted that this is out of network for patient. Suggest he contact his [facility] company and [facility] and follow through with them for consult/sleep study.   Snoring, EDS, new diabetic- will set up for HST for possible sleep apnea.   Literature provided regarding sleep apnea.      He will follow up with me in approximately two weeks after his sleep " study has been competed to review the results and discuss plan of care.       Polysomnography reviewed.  Obstructive sleep apnea reviewed.  Complications of untreated sleep apnea were reviewed.      CC: Berto Haq

## 2018-04-04 NOTE — PATIENT INSTRUCTIONS
My Diabetes Care Goals:    Monitoring: I will check blood sugar before breakfast and one other time of day that you choose.     Follow up:  Follow-up diabetes education appointment scheduled on 4/25.      Bring blood glucose meter and logbook with you to all doctor and follow-up appointments.     [facility] for the [facility]:  For Your Diabetes Education and Nutrition Appointments Call:  [phone]   For Diabetes Education or Nutrition Related Questions:   Phone: [phone]  E-mail: [email or web address]  Fax: [phone]   If you need a medication refill please contact your pharmacy. Please allow 3 business days for your refills to be completed.    Instructions for emailing the Diabetes Educators    If you need to communicate a non-urgent message to a Diabetes Educator via email, please send to [email or web address].    Please follow the following email guidelines:    Subject line: Secure: your clinic name (example: Secure: [facility])  In the email please include: First name, middle initial, last name and date of birth.    We will be in touch with you within one (1) business day.

## 2018-04-04 NOTE — PATIENT INSTRUCTIONS
"MY TREATMENT INFORMATION FOR SLEEP APNEA-  Ray Galdamez    DOCTOR : Dann Vincent  SLEEP CENTER :      MY CONTACT NUMBER:     Am I having a sleep study at a sleep center?  Make sure you have an appointment for the study before you leave!    Am I having a home sleep study?  Watch this video:  https://www.Bramasol.com/watch?v=CteI_GhyP9g&list=[email or web address]_[email or web address]    Frequently asked questions:  1. What is Obstructive Sleep Apnea (PAULA)? PAULA is the most common type of sleep apnea. Apnea means, \"without breath.\"  Apnea is most often caused by narrowing or collapse of the upper airway as muscles relax during sleep.   Almost everyone has occasional apneas. Most people with sleep apnea have had brief interruptions at night frequently for many years.  The severity of sleep apnea is related to how frequent and severe the events are.   2. What are the consequences of PAULA? Symptoms include: feeling sleepy during the day, snoring loudly, gasping or stopping of breathing, trouble sleeping, and occasionally morning headaches or heartburn at night.  Sleepiness can be serious and even increase the risk of falling asleep while driving. Other health consequences may include development of high blood pressure and other cardiovascular disease in persons who are susceptible. Untreated PAULA  can contribute to heart disease, stroke and diabetes.   3. What are the treatment options? In most situations, sleep apnea is a lifelong disease that must be managed with daily therapy. Medications are not effective for sleep apnea and surgery is generally not considered until other therapies have been tried. Your treatment is your choice . Continuous Positive Airway (CPAP) works right away and is the therapy that is effective in nearly everyone. An oral device to hold your jaw forward is usually the next most reliable option. Other options include postioning devices (to keep you off your back), weight loss, and surgery including a " tongue pacing device. There is more detail about some of these options below.    Important tips for using CPAP and similar devices   Know your equipment:  CPAP is continuous positive airway pressure that prevents obstructive sleep apnea by keeping the throat from collapsing while you are sleeping. In most cases, the device is  smart  and can slowly self-adjusts if your throat collapses and keeps a record every day of how well you are treated-this information is available to you and your care team.  BPAP is bilevel positive airway pressure that keeps your throat open and also assists each breath with a pressure boost to maintain adequate breathing.  Special kinds of BPAP are used in patients who have inadequate breathing from lung or heart disease. In most cases, the device is  smart  and can slowly self-adjusts to assist breathing. Like CPAP, the device keeps a record of how well you are treated.  Your mask is your connection to the device. You get to choose what feels most comfortable and the staff will help to make sure if fits. Here: are some examples of the different masks that are available:       Key points to remember on your journey with sleep apnea:  1. Sleep study.  PAP devices often need to be adjusted during a sleep study to show that they are effective and adjusted right.  2. Good tips to remember: Try wearing just the mask during a quiet time during the day so your body adapts to wearing it. A humidifier is recommended for comfort in most cases to prevent drying of your nose and throat. Allergy medication from your provider may help you if you are having nasal congestion.  3. Getting settled-in. It takes more than one night for most of us to get used to wearing a mask. Try wearing just the mask during a quiet time during the day so your body adapts to wearing it. A humidifier is recommended for comfort in most cases. Our team will work with you carefully on the first day and will be in contact within 4  days and again at 2 and 4 weeks for advice and remote device adjustments. Your therapy is evaluated by the device each day.   4. Use it every night. The more you are able to sleep naturally for 7-8 hours, the more likely you will have good sleep and to prevent health risks or symptoms from sleep apnea. Even if you use it 4 hours it helps. Occasionally all of us are unable to use a medical therapy, in sleep apnea, it is not dangerous to miss one night.   5. Communicate. Call our skilled team on the number provided on the first day if your visit for problems that make it difficult to wear the device. Over 2 out of 3 patients can learn to wear the device long-term with help from our team. Remember to call our team or your sleep providers if you are unable to wear the device as we may have other solutions for those who cannot adapt to mask CPAP therapy. It is recommended that you sleep your sleep provider within the first 3 months and yearly after that if you are not having problems.   Take care of your equipment. Make sure you clean your mask and tubing using directions every day and that your filter and mask are replaced as recommended or if they are not working.     BESIDES CPAP, WHAT OTHER THERAPIES ARE THERE?    Positioning Device  Positioning devices are generally used when sleep apnea is mild and only occurs on your back.This example shows a pillow that straps around the waist. It may be appropriate for those whose sleep study shows milder sleep apnea that occurs primarily when lying flat on one's back. Preliminary studies have shown benefit but effectiveness at home may need to be verified by a home sleep test. These devices are generally not covered by medical insurance.  Examples of devices that maintain sleeping on the back to prevent snoring and mild sleep apnea.    Belt type body positioner  Http://[employer]DailyWorth/    Electronic reminder  Http://nightshifttherapy.com/  Http://www.[employer].com.au/    Oral  Appliance  What is oral appliance therapy?  An oral appliance device fits on your teeth at night like a retainer used after having braces. The device is made by a specialized dentist and requires several visits over 1-2 months before a manufactured device is made to fit your teeth and is adjusted to prevent your sleep apnea. Once an oral device is working properly, snoring should be improved. A home sleep test may be recommended at that time if to determine whether the sleep apnea is adequately treated.       Some things to remember:  -Oral devices are often, but not always, covered by your medical insurance. Be sure to check with your insurance provider.   -If you are referred for oral therapy, you will be given a list of specialized dentists to consider or you may choose to visit the Web site of the American Academy of Dental Sleep Medicine  -Oral devices are less likely to work if you have severe sleep apnea or are extremely overweight.     More detailed information  An oral appliance is a small acrylic device that fits over the upper and lower teeth  (similar to a retainer or a mouth guard). This device slightly moves jaw forward, which moves the base of the tongue forward, opens the airway, improves breathing for effective treat snoring and obstructive sleep apnea in perhaps 7 out of 10 people .  The best working devices are custom-made by a dental device  after a mold is made of the teeth 1, 2, 3.  When is an oral appliance indicated?  Oral appliance therapy is recommended as a first-line treatment for patients with primary snoring, mild sleep apnea, and for patients with moderate sleep apnea who prefer appliance therapy to use of CPAP4, 5. Severity of sleep apnea is determined by sleep testing and is based on the number of respiratory events per hour of sleep.   How successful is oral appliance therapy?  The success rate of oral appliance therapy in patients with mild sleep apnea is 75-80% while  in patients with moderate sleep apnea it is 50-70%. The chance of success in patients with severe sleep apnea is 40-50%. The research also shows that oral appliances have a beneficial effect on the cardiovascular health of PAULA patients at the same magnitude as CPAP therapy7.  Oral appliances should be a second-line treatment in cases of severe sleep apnea, but if not completely successful then a combination therapy utilizing CPAP plus oral appliance therapy may be effective. Oral appliances tend to be effective in a broad range of patients although studies show that the patients who have the highest success are females, younger patients, those with milder disease, and less severe obesity. 3, 6.   Finding a dentist that practices dental sleep medicine  Specific training is available through the American Academy of Dental Sleep Medicine for dentists interested in working in the field of sleep. To find a dentist who is educated in the field of sleep and the use of oral appliances, near you, visit the Web site of the American Academy of Dental Sleep Medicine.    References  1. Verito, et al. Objectively measured vs self-reported compliance during oral appliance therapy for sleep-disordered breathing. Chest 2013; 144(5): 4751-7519.  2. Yordan, et al. Objective measurement of compliance during oral appliance therapy for sleep-disordered breathing. Thorax 2013; 68(1): 91-96.  3. Sarah, et al. Mandibular advancement devices in 620 men and women with PAULA and snoring: tolerability and predictors of treatment success. Chest 2004; 125: 7255-8448.  4. Hale, et al. Oral appliances for snoring and PAULA: a review. Sleep 2006; 29: 244-262.  5. Anil et al. Oral appliance treatment for PAULA: an update. J Clin Sleep Med 2014; 10(2): 215-227.  6. Madiha, et al. Predictors of OSAH treatment outcome. J Dent Res 2007; 86: 3463-6963.      Weight Loss:    Weight loss is a long-term strategy that may improve sleep apnea  in some patients.    Weight management is a personal decision and the decision should be based on your interest and the potential benefits.  If you are interested in exploring weight loss strategies, the following discussion covers the impact on weight loss on sleep apnea and the approaches that may be successful.    Being overweight does not necessarily mean you will have health consequences.  Those who have BMI over 35 or over 27 with existing medical conditions carries greater risk.   Weight loss decreases severity of sleep apnea in most people with obesity. For those with mild obesity who have developed snoring with weight gain, even 15-30 pound weight loss can improve and occasionally eliminate sleep apnea.  Structured and life-long dietary and health habits are necessary to lose weight and keep healthier weight levels.     Though there may be significant health benefits from weight loss, long-term weight loss is very difficult to achieve- studies show success with dietary management in less than 10% of people. In addition, substantial weight loss may require years of dietary control and may be difficult if patients have severe obesity. In these cases, surgical management may be considered.  Finally, older individuals who have tolerated obesity without health complications may be less likely to benefit from weight loss strategies.        Your BMI is Body mass index is 30.38 kg/(m^2).  Weight management is a personal decision.  If you are interested in exploring weight loss strategies, the following discussion covers the approaches that may be successful. Body mass index (BMI) is one way to tell whether you are at a healthy weight, overweight, or obese. It measures your weight in relation to your height.  A BMI of 18.5 to 24.9 is in the healthy range. A person with a BMI of 25 to 29.9 is considered overweight, and someone with a BMI of 30 or greater is considered obese. More than two-thirds of American adults  are considered overweight or obese.  Being overweight or obese increases the risk for further weight gain. Excess weight may lead to heart disease and diabetes.  Creating and following plans for healthy eating and physical activity may help you improve your health.  Weight control is part of healthy lifestyle and includes exercise, emotional health, and healthy eating habits. Careful eating habits lifelong are the mainstay of weight control. Though there are significant health benefits from weight loss, long-term weight loss with diet alone may be very difficult to achieve- studies show long-term success with dietary management in less than 10% of people. Attaining a healthy weight may be especially difficult to achieve in those with severe obesity. In some cases, medications, devices and surgical management might be considered.  What can you do?  If you are overweight or obese and are interested in methods for weight loss, you should discuss this with your provider.     Consider reducing daily calorie intake by 500 calories.     Keep a food journal.     Avoiding skipping meals, consider cutting portions instead.    Diet combined with exercise helps maintain muscle while optimizing fat loss. Strength training is particularly important for building and maintaining muscle mass. Exercise helps reduce stress, increase energy, and improves fitness. Increasing exercise without diet control, however, may not burn enough calories to loose weight.       Start walking three days a week 10-20 minutes at a time    Work towards walking thirty minutes five days a week     Eventually, increase the speed of your walking for 1-2 minutes at time    In addition, we recommend that you review healthy lifestyles and methods for weight loss available through the National Institutes of Health patient information sites:  http://win.niddk.nih.gov/publications/index.htm    And look into health and wellness programs that may be available  through your health insurance provider, employer, local community center, or [employer].    Weight management plan: Patient was referred to their PCP to discuss a diet and exercise plan.      Surgery:    Surgery for obstructive sleep apnea is considered generally only when other therapies fail to work. Surgery may be discussed with you if you are having a difficult time tolerating CPAP and or when there is an abnormal structure that requires surgical correction.  Nose and throat surgeries often enlarge the airway to prevent collapse.  Most of these surgeries create pain for 1-2 weeks and up to half of the most common surgeries are not effective throughout life.  You should carefully discuss the benefits and drawbacks to surgery with your sleep provider and surgeon to determine if it is the best solution for you.   More information  Surgery for PAULA is directed at areas that are responsible for narrowing or complete obstruction of the airway during sleep.  There are a wide range of procedures available to enlarge and/or stabilize the airway to prevent blockage of breathing in the three major areas where it can occur: the palate, tongue, and nasal regions.  Successful surgical treatment depends on the accurate identification of the factors responsible for obstructive sleep apnea in each person.  A personalized approach is required because there is no single treatment that works well for everyone.  Because of anatomic variation, consultation with an examination by a sleep surgeon is a critical first step in determining what surgical options are best for each patient.  In some cases, examination during sedation may be recommended in order to guide the selection of procedures.  Patients will be counseled about risks and benefits as well as the typical recovery course after surgery. Surgery is typically not a cure for a person s PAULA.  However, surgery will often significantly improve one s PAULA severity (termed  success  rate ).  Even in the absence of a cure, surgery will decrease the cardiovascular risk associated with OSA7; improve overall quality of life8 (sleepiness, functionality, sleep quality, etc).      Palate Procedures:  Patients with PAULA often have narrowing of their airway in the region of their tonsils and uvula.  The goals of palate procedures are to widen the airway in this region as well as to help the tissues resist collapse.  Modern palate procedure techniques focus on tissue conservation and soft tissue rearrangement, rather than tissue removal.  Often the uvula is preserved in this procedure. Residual sleep apnea is common in patient after pharyngoplasty with an average reduction in sleep apnea events of 33%2.      Tongue Procedures:  ExamWhile patients are awake, the muscles that surround the throat are active and keep this region open for breathing. These muscles relax during sleep, allowing the tongue and other structures to collapse and block breathing.  There are several different tongue procedures available.  Selection of a tongue base procedure depends on characteristics seen on physical exam.  Generally, procedures are aimed at removing bulky tissues in this area or preventing the back of the tongue from falling back during sleep.  Success rates for tongue surgery range from 50-62%3.    Hypoglossal Nerve Stimulation:  Hypoglossal nerve stimulation has recently received approval from the [place] Food and Drug Administration for the treatment of obstructive sleep apnea.  This is based on research showing that the system was safe and effective in treating sleep apnea6.  Results showed that the median AHI score decreased 68%, from 29.3 to 9.0. This therapy uses an implant system that senses breathing patterns and delivers mild stimulation to airway muscles, which keeps the airway open during sleep.  The system consists of three fully implanted components: a small generator (similar in size to a  pacemaker), a breathing sensor, and a stimulation lead.  Using a small handheld remote, a patient turns the therapy on before bed and off upon awakening.    Candidates for this device must be greater than 22 years of age, have moderate to severe PAULA (AHI between 20-65), BMI less than 32, have tried CPAP/oral appliance without success, and have appropriate upper airway anatomy (determined by a sleep endoscopy performed by Dr. Davis).    Hypoglossal Nerve Stimulation Pathway:    The sleep surgeon s office will work with the patient through the insurance prior-authorization process (including communications and appeals).    Nasal Procedures:  Nasal obstruction can interfere with nasal breathing during the day and night.  Studies have shown that relief of nasal obstruction can improve the ability of some patients to tolerate positive airway pressure therapy for obstructive sleep apnea1.  Treatment options include medications such as nasal saline, topical corticosteroid and antihistamine sprays, and oral medications such as antihistamines or decongestants. Non-surgical treatments can include external nasal dilators for selected patients. If these are not successful by themselves, surgery can improve the nasal airway either alone or in combination with these other options.      Combination Procedures:  Combination of surgical procedures and other treatments may be recommended, particularly if patients have more than one area of narrowing or persistent positional disease.  The success rate of combination surgery ranges from 66-80%2,3.    References  1. Katt MORALES. The Role of the Nose in Snoring and Obstructive Sleep Apnoea: An Update.  Eur Arch Otorhinolaryngol. 2011; 268: 1365-73.  2.  Kevin SM; Kyrie JA; Dianne JR; Pallanch JF; Yobany BARRIOS; Kimmy SG; Bhavin GOODMAN. Surgical modifications of the upper airway for obstructive sleep apnea in adults: a systematic review and meta-analysis. SLEEP 2010;33(10):6595-9981. Kendall  E. Hypopharyngeal surgery in obstructive sleep apnea: an evidence-based medicine review.  Arch Otolaryngol Head Neck Surg. 2006 Feb;132(2):206-13.  3. Javier YH1, Hope Y, Andrew ANTONIO. The efficacy of anatomically based multilevel surgery for obstructive sleep apnea. Otolaryngol Head Neck Surg. 2003 Oct;129(4):327-35.  4. Kendall BLANC, Goldberg A. Hypopharyngeal Surgery in Obstructive Sleep Apnea: An Evidence-Based Medicine Review. Arch Otolaryngol Head Neck Surg. 2006 Feb;132(2):206-13.  5. Carlos Enrique PJ et al. Upper-Airway Stimulation for Obstructive Sleep Apnea.  N Engl J Med. 2014 Jan 9;370(2):139-49.  6. Jose Enrique Y et al. Increased Incidence of Cardiovascular Disease in Middle-aged Men with Obstructive Sleep Apnea. Am J Respir Crit Care Med; 2002 166: 159-165  7. Sanford EM et al. Studying Life Effects and Effectiveness of Palatopharyngoplasty (SLEEP) study: Subjective Outcomes of Isolated Uvulopalatopharyngoplasty. Otolaryngol Head Neck Surg. 2011; 144: 623-631.

## 2018-04-04 NOTE — NURSING NOTE
"Chief Complaint   Patient presents with     Sleep Problem     Snoring, dry mouth     Consult       Initial /62  Pulse 80  Resp 16  Ht 1.791 m (5' 10.5\")  Wt 97.4 kg (214 lb 12.8 oz)  SpO2 96%  BMI 30.38 kg/m2 Estimated body mass index is 30.38 kg/(m^2) as calculated from the following:    Height as of this encounter: 1.791 m (5' 10.5\").    Weight as of this encounter: 97.4 kg (214 lb 12.8 oz).  Medication Reconciliation: complete     Stop bang= 4    Neck circumference: 42 cm    Shahla Payne CMA     "

## 2018-04-04 NOTE — PROGRESS NOTES
Diabetes Self Management Training: Initial Assessment Visit for Newly Diagnosed Patients (Complete AADE Goals Flowsheet)    Ray Galdamez presents today for education related to Type 2 diabetes.    He is accompanied by self    Patient's diabetes management related comments/concerns: has done some research and made diet changes, and feeling better but vision has gotten worse    Patient's emotional response to diabetes: expresses readiness to learn    Patient would like this visit to be focused around the following diabetes-related behaviors and goals: Healthy Eating and Monitoring    ASSESSMENT:  Patient Problem List and Family Medical History reviewed for relevant medical history, current medical status, and diabetes risk factors.    Current Diabetes Management per Patient:  Taking diabetes medications?   yes:     Diabetes Medication(s)     Biguanides Sig    metFORMIN (GLUCOPHAGE) 500 MG tablet Take 1 tablet (500 mg) by mouth daily (with dinner)          Past Diabetes Education: Newly diagnosed    Patient glucose self monitoring as follows: BG meter taught today.     Patient's most recent   Lab Results   Component Value Date    A1C 11.4 03/21/2018    is not meeting goal of <8.0    Nutrition:  Patient has reduced sugar and carbohydrates in pop, potatoes, pasta. Was drinking 1-2 cans/day.     Breakfast - toast, sugar free jam, maybe fruit  Lunch - sandwich or salad or leftovers; previously was eating fast foods   Dinner - usually would have had meat/potatoes; now meat/vegetables/fruit   Snacks - was vending machine and now nuts or fruit    Beverages: no coffee, milk, water mainly now    Cultural/Denominational diet restrictions: No     Biggest Challenge to Healthy Eating: none    Physical Activity:    Type: none  Limitations: none stated    Diabetes Risk Factors:  family history, age over 45 years and inactivity    Diabetes Complications:  Acute Complications: At risk for hypoglycemia? no  Symptoms of hyperglycemia?  "increased thirst, frequent urination which are better now;  blurred vision and feeling drowsy/tired    Vitals:  There were no vitals taken for this visit.  Estimated body mass index is 30.38 kg/(m^2) as calculated from the following:    Height as of an earlier encounter on 4/4/18: 1.791 m (5' 10.5\").    Weight as of an earlier encounter on 4/4/18: 97.4 kg (214 lb 12.8 oz).   Last 3 BP:   BP Readings from Last 3 Encounters:   04/04/18 118/62   03/30/18 110/87   03/29/18 118/78       History   Smoking Status     Never Smoker   Smokeless Tobacco     Never Used       Labs:  Lab Results   Component Value Date    A1C 11.4 03/21/2018     Lab Results   Component Value Date     03/21/2018     Lab Results   Component Value Date    LDL  03/21/2018     Cannot estimate LDL when triglyceride exceeds 400 mg/dL     HDL Cholesterol   Date Value Ref Range Status   03/21/2018 34 (L) >39 mg/dL Final   ]  GFR Estimate   Date Value Ref Range Status   04/07/2017 >90  Non  GFR Calc   >60 mL/min/1.7m2 Final     GFR Estimate If Black   Date Value Ref Range Status   04/07/2017 >90   GFR Calc   >60 mL/min/1.7m2 Final     Lab Results   Component Value Date    CR 0.70 04/07/2017     No results found for: MICROALBUMIN    Socio/Economic Considerations:    Support system: spouse/significant other    Health Beliefs and Attitudes:   Patient Activation Measure Survey Score:  No flowsheet data found.    Stage of Change: ACTION (Actively working towards change)      Diabetes knowledge and skills assessment:     Patient is knowledgeable in diabetes management concepts related to: Healthy Eating and Taking Medication    Patient needs further education on the following diabetes management concepts: Healthy Eating, Being Active, Monitoring, Taking Medication, Problem Solving, Reducing Risks and Healthy Coping    Barriers to Learning Assessment: No Barriers identified    Based on learning assessment above, most " appropriate setting for further diabetes education would be: Group class or Individual setting.    INTERVENTION:   Education provided today on:  AADE Self-Care Behaviors:  Healthy Eating: carbohydrate counting, portion control and label reading  Being Active: relationship to blood glucose  Monitoring: purpose, proper technique, log and interpret results, individual blood glucose targets, frequency of monitoring and proper sharps disposal  Taking Medication: side effects of prescribed medications  Problem Solving: high blood glucose - causes, signs/symptoms, treatment and prevention  Reducing Risks: A1C - goals, relating to blood glucose levels, how often to check  Healthy Coping: benefits of making appropriate lifestyle changes  Patient was instructed on One Touch Verio Flex meter and was able to provide an accurate return demonstration. Patient's blood glucose reading today was 172 mg/dL.    Opportunities for ongoing education and support in diabetes-self management were discussed.    Pt verbalized understanding of concepts discussed and recommendations provided today.       Education Materials Provided:  [facility] Understanding Diabetes Booklet, BG Log Sheet and One Touch Verio Flex meter kit    PLAN:  See Patient Instructions for co-developed, patient-stated behavior change goals.  AVS printed and provided to patient today.  Blood sugars appear to be coming down as indicated by 172 today.     FOLLOW-UP:  Follow-up appointment scheduled on 4/25.  Chart routed to referring provider.    Ongoing plan for education and support: Online diabetes websites/forums, Written resources (magazines, books, etc.), Follow-up visit with diabetes educator in 3 weeks and Follow-up with primary care provider    LUZ Stevenson RDN, IMANI    Time Spent: 60 minutes  Encounter Type: Individual    Any diabetes medication dose changes were made via the CDE Protocol and Collaborative Practice Agreement with the patient's primary care  provider. A copy of this encounter was shared with the provider.

## 2018-04-05 DIAGNOSIS — E11.9 DIABETES MELLITUS, TYPE 2 (H): Primary | ICD-10-CM

## 2018-04-05 NOTE — TELEPHONE ENCOUNTER
Prescription approved per [facility] Refill Protocol.    Rehana Kaiser RN. . .  4/5/2018, 2:43 PM

## 2018-04-16 ENCOUNTER — TELEPHONE (OUTPATIENT)
Dept: EDUCATION SERVICES | Facility: CLINIC | Age: 53
End: 2018-04-16

## 2018-04-16 DIAGNOSIS — E11.9 DIABETES MELLITUS (H): ICD-10-CM

## 2018-04-16 NOTE — TELEPHONE ENCOUNTER
Patient called requesting refill of test strips.    Per review of medication list updated test strip prescription was sent 4/4/18.  Left message for patient asking for further clarification of what supplies were needed or why he was unable to  new supplies.    Verona Bobo MS, RD, LD, CDE

## 2018-04-17 NOTE — TELEPHONE ENCOUNTER
Spoke with patient.  He has received testing supplies, but only 1 bottle of 50 not 100.  Patient is testing before and after 2 meals per day and is finding that it is helping him with the lifestyle changes.  Revised testing supplies, but informed patient it may not be covered by his insurance as he is not currently taking insulin.  Patient verbalized understanding.    Verona Bobo MS, RD, LD, CDE

## 2018-04-25 ENCOUNTER — ALLIED HEALTH/NURSE VISIT (OUTPATIENT)
Dept: EDUCATION SERVICES | Facility: CLINIC | Age: 53
End: 2018-04-25
Payer: COMMERCIAL

## 2018-04-25 DIAGNOSIS — E11.9 DIABETES MELLITUS (H): Primary | ICD-10-CM

## 2018-04-25 PROCEDURE — G0108 DIAB MANAGE TRN  PER INDIV: HCPCS

## 2018-04-25 NOTE — MR AVS SNAPSHOT
"              After Visit Summary   4/25/2018    Ray Galdamez    MRN: [id number]           Patient Information     Date Of Birth          1965        Visit Information        Provider Department      4/25/2018 4:00 PM [facility] DIABETIC ED RESOURCE [facility]        Today's Diagnoses     Diabetes mellitus (H)    -  1       Follow-ups after your visit        Your next 10 appointments already scheduled     May 10, 2018  7:30 AM CDT   SHORT with Berto Haq MD   [facility] ([facility]    [address] [place] [id number] [phone]              Who to contact     If you have questions or need follow up information about today's clinic visit or your schedule please contact [facility] directly at [phone].  Normal or non-critical lab and imaging results will be communicated to you by MyChart, letter or phone within 4 business days after the clinic has received the results. If you do not hear from us within 7 days, please contact the clinic through MyChart or phone. If you have a critical or abnormal lab result, we will notify you by phone as soon as possible.  Submit refill requests through [employer]bikini or call your pharmacy and they will forward the refill request to us. Please allow 3 business days for your refill to be completed.          Additional Information About Your Visit        MyChart Information     Spacebikini lets you send messages to your doctor, view your test results, renew your prescriptions, schedule appointments and more. To sign up, go to [email or web address] . Click on \"Log in\" on the left side of the screen, which will take you to the Welcome page. Then click on \"Sign up Now\" on the right side of the page.     You will be asked to enter the access code listed below, as well as some personal information. Please follow the directions to create your username and password.     Your " access code is: [email or web address]  Expires: 2018  4:16 PM     Your access code will  in 90 days. If you need help or a new code, please call your [facility] or [phone].        Care EveryWhere ID     This is your Care EveryWhere ID. This could be used by other organizations to access your [place] medical records  [id number]         Blood Pressure from Last 3 Encounters:   18 118/62   18 110/87   18 118/78    Weight from Last 3 Encounters:   18 97.4 kg (214 lb 12.8 oz)   18 98.9 kg (218 lb)   18 98.4 kg (217 lb)              We Performed the Following     DIABETES EDUCATION - Individual  []          Today's Medication Changes          These changes are accurate as of 18  4:52 PM.  If you have any questions, ask your nurse or doctor.               These medicines have changed or have updated prescriptions.        Dose/Directions    metFORMIN 500 MG tablet   Commonly known as:  GLUCOPHAGE   This may have changed:  when to take this   Used for:  Diabetes mellitus (H)        Dose:  500 mg   Take 1 tablet (500 mg) by mouth 2 times daily (with meals)   Quantity:  60 tablet   Refills:  3            Where to get your medicines      These medications were sent to [address]  [address] [facility] [address]     Phone:  [phone]     metFORMIN 500 MG tablet                Primary Care Provider Office Phone # Fax #    [facility] [phone]       [address] [facility] [address]        Equal Access to Services     [facility] AH: Hadii aad ku hadasho Soomaali, waaxda luqadaha, qaybta kaalmada adeegyada, jose manuel blevins. So [facility] [phone].    ATENCIÓN: Si habla español, tiene a steele disposición servicios gratuitos de asistencia lingüística. Llame al [phone].    We comply with applicable federal civil rights laws and [place] laws. We do not discriminate on the  basis of race, color, national origin, age, disability, sex, sexual orientation, or gender identity.            Thank you!     Thank you for choosing [facility]  for your care. Our goal is always to provide you with excellent care. Hearing back from our patients is one way we can continue to improve our services. Please take a few minutes to complete the written survey that you may receive in the mail after your visit with us. Thank you!             Your Updated Medication List - Protect others around you: Learn how to safely use, store and throw away your medicines at www.disposemymeds.org.          This list is accurate as of 4/25/18  4:52 PM.  Always use your most recent med list.                   Brand Name Dispense Instructions for use Diagnosis    * blood glucose monitoring lancets     100 each    Use to test blood sugar twice daily    Diabetes mellitus (H)       * blood glucose monitoring lancets     100 each    Use to test blood sugar 2 times daily or as directed.    Diabetes mellitus, type 2 (H)       blood glucose monitoring test strip    ONETOUCH VERIO IQ    150 each    Use to test blood sugars 4 times daily before and after 2 meals daily.    Diabetes mellitus (H)       metFORMIN 500 MG tablet    GLUCOPHAGE    60 tablet    Take 1 tablet (500 mg) by mouth 2 times daily (with meals)    Diabetes mellitus (H)       * Notice:  This list has 2 medication(s) that are the same as other medications prescribed for you. Read the directions carefully, and ask your doctor or other care provider to review them with you.

## 2018-04-25 NOTE — PROGRESS NOTES
"Diabetes Self Management Training: Follow-up Visit    Ray Galdamez presents today for education and evaluation of glucose control related to Type 2 diabetes.    He is accompanied by self    Patient's diabetes management related comments/concerns: how many times day should I test?     Patient would like this visit to be focused around the following diabetes-related behaviors and goals: Monitoring and Taking Medication    ASSESSMENT:  Patient Problem List reviewed for relevant medical history and current medical status.    Current Diabetes Management per Patient:  Taking diabetes medications?   yes:     Diabetes Medication(s)     Biguanides Sig    metFORMIN (GLUCOPHAGE) 500 MG tablet Take 1 tablet (500 mg) by mouth daily (with dinner)          Patient glucose self monitoring as follows: four times daily.   BG meter: One Touch Verio Flex meter  BG results: fasting glucose- 16% in target, post-breakfast glucose- 44% in target, pre-lunch glucose- 100% in target, post-lunch glucose- 50% in target, pre-supper glucose- 61% in target and post-supper glucose- 38% in target and bedtime 30% in target.      BG values are: significantly improved but not to target yet  Patient's most recent   Lab Results   Component Value Date    A1C 11.4 03/21/2018    is not meeting goal of <7.0    Nutrition:  Patient has made bid reductions in carb intake.     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    Vitals:  There were no vitals taken for this visit.  Estimated body mass index is 30.38 kg/(m^2) as calculated from the following:    Height as of 4/4/18: 1.791 m (5' 10.5\").    Weight as of 4/4/18: 97.4 kg (214 lb 12.8 oz).   Last 3 BP:   BP Readings from " Last 3 Encounters:   04/04/18 118/62   03/30/18 110/87   03/29/18 118/78       History   Smoking Status     Never Smoker   Smokeless Tobacco     Never Used       Labs:  Lab Results   Component Value Date    A1C 11.4 03/21/2018     Lab Results   Component Value Date     03/21/2018     Lab Results   Component Value Date    LDL  03/21/2018     Cannot estimate LDL when triglyceride exceeds 400 mg/dL     HDL Cholesterol   Date Value Ref Range Status   03/21/2018 34 (L) >39 mg/dL Final   ]  GFR Estimate   Date Value Ref Range Status   04/07/2017 >90  Non  GFR Calc   >60 mL/min/1.7m2 Final     GFR Estimate If Black   Date Value Ref Range Status   04/07/2017 >90   GFR Calc   >60 mL/min/1.7m2 Final     Lab Results   Component Value Date    CR 0.70 04/07/2017     No results found for: MICROALBUMIN    Health Beliefs and Attitudes:   Patient Activation Measure Survey Score:  No flowsheet data found.    Stage of Change: ACTION (Actively working towards change)    Progress toward meeting diabetes-related behavioral goals:    GOALS % Met Goal   Healthy Eating  100   Physical Activity     Monitoring  100   Medication Taking  100   Problem Solving     Healthy Coping     Risk Reduction           Diabetes knowledge and skills assessment:     Patient is knowledgeable in diabetes management concepts related to: Healthy Eating, Being Active, Monitoring, Taking Medication and Healthy Coping    Patient needs further education on the following diabetes management concepts: Monitoring, Taking Medication and Reducing Risks    Barriers to Learning Assessment: No Barriers identified    Based on learning assessment above, most appropriate setting for further diabetes education would be: Individual setting.    INTERVENTION:    Education provided today on:  AADE Self-Care Behaviors:  Healthy Eating: encouragement on changes and discussion of sustainability  Being Active: relationship to blood  glucose  Monitoring: log and interpret results, individual blood glucose targets and frequency of monitoring  Taking Medication: when to take medications and dose increase  Healthy Coping: benefits of making appropriate lifestyle changes    Opportunities for ongoing education and support in diabetes-self management were discussed.    Pt verbalized understanding of concepts discussed and recommendations provided today.       Education Materials Provided:  BG Log Sheet    PLAN:  Recommend change to oral medication regimen - increase to  mg metformin     FOLLOW-UP:  Education topics to cover at the next diabetes education visit(s): review BG and discuss long term complication prevention  Follow-up with PCP recommended.  Chart routed to referring provider.  Schedule CDE final visit for July    Ongoing plan for education and support: Written resources (magazines, books, etc.), Follow-up visit with diabetes educator in 2 months and Follow-up with primary care provider    LUZ Stevenson RDN, CDE    Time Spent: 30 minutes  Encounter Type: Individual    Any diabetes medication dose changes were made via the CDE Protocol and Collaborative Practice Agreement with the patient's primary care provider. A copy of this encounter was shared with the provider.

## 2018-04-25 NOTE — Clinical Note
Patient is doing great with bringing BG down. Increased his metformin to bid and he will see you for follow up in a couple weeks. I will see him in July.  Thanks,  Mattie Acosta RDN, LD, CDE

## 2018-05-10 ENCOUNTER — OFFICE VISIT (OUTPATIENT)
Dept: FAMILY MEDICINE | Facility: CLINIC | Age: 53
End: 2018-05-10
Payer: COMMERCIAL

## 2018-05-10 VITALS
TEMPERATURE: 97.7 F | RESPIRATION RATE: 14 BRPM | WEIGHT: 217 LBS | HEART RATE: 72 BPM | SYSTOLIC BLOOD PRESSURE: 114 MMHG | OXYGEN SATURATION: 96 % | DIASTOLIC BLOOD PRESSURE: 78 MMHG | BODY MASS INDEX: 30.38 KG/M2 | HEIGHT: 71 IN

## 2018-05-10 DIAGNOSIS — E11.9 TYPE 2 DIABETES MELLITUS WITHOUT COMPLICATION, WITHOUT LONG-TERM CURRENT USE OF INSULIN (H): Primary | ICD-10-CM

## 2018-05-10 LAB
ANION GAP SERPL CALCULATED.3IONS-SCNC: 5 MMOL/L (ref 3–14)
BUN SERPL-MCNC: 14 MG/DL (ref 7–30)
CALCIUM SERPL-MCNC: 8.6 MG/DL (ref 8.5–10.1)
CHLORIDE SERPL-SCNC: 105 MMOL/L (ref 94–109)
CO2 SERPL-SCNC: 30 MMOL/L (ref 20–32)
CREAT SERPL-MCNC: 0.8 MG/DL (ref 0.66–1.25)
CREAT UR-MCNC: 179 MG/DL
GFR SERPL CREATININE-BSD FRML MDRD: >90 ML/MIN/1.7M2
GLUCOSE SERPL-MCNC: 216 MG/DL (ref 70–99)
HBA1C MFR BLD: 8 % (ref 0–5.6)
MICROALBUMIN UR-MCNC: 14 MG/L
MICROALBUMIN/CREAT UR: 7.6 MG/G CR (ref 0–17)
POTASSIUM SERPL-SCNC: 3.7 MMOL/L (ref 3.4–5.3)
SODIUM SERPL-SCNC: 140 MMOL/L (ref 133–144)

## 2018-05-10 PROCEDURE — 83036 HEMOGLOBIN GLYCOSYLATED A1C: CPT | Performed by: OBSTETRICS & GYNECOLOGY

## 2018-05-10 PROCEDURE — 80048 BASIC METABOLIC PNL TOTAL CA: CPT | Performed by: OBSTETRICS & GYNECOLOGY

## 2018-05-10 PROCEDURE — 99214 OFFICE O/P EST MOD 30 MIN: CPT | Performed by: OBSTETRICS & GYNECOLOGY

## 2018-05-10 PROCEDURE — 82043 UR ALBUMIN QUANTITATIVE: CPT | Performed by: OBSTETRICS & GYNECOLOGY

## 2018-05-10 PROCEDURE — 36415 COLL VENOUS BLD VENIPUNCTURE: CPT | Performed by: OBSTETRICS & GYNECOLOGY

## 2018-05-10 ASSESSMENT — PAIN SCALES - GENERAL: PAINLEVEL: NO PAIN (0)

## 2018-05-10 NOTE — MR AVS SNAPSHOT
"              After Visit Summary   5/10/2018    Ray Galdamez    MRN: [id number]           Patient Information     Date Of Birth          1965        Visit Information        Provider Department      5/10/2018 7:30 AM Berto Haq MD [facility]        Today's Diagnoses     Type 2 diabetes mellitus without complication, without long-term current use of insulin (H)    -  1       Follow-ups after your visit        Who to contact     If you have questions or need follow up information about today's clinic visit or your schedule please contact [facility] directly at [phone].  Normal or non-critical lab and imaging results will be communicated to you by MyChart, letter or phone within 4 business days after the clinic has received the results. If you do not hear from us within 7 days, please contact the clinic through [employer] or phone. If you have a critical or abnormal lab result, we will notify you by phone as soon as possible.  Submit refill requests through [employer] or call your pharmacy and they will forward the refill request to us. Please allow 3 business days for your refill to be completed.          Additional Information About Your Visit        MyChart Information     [employer] lets you send messages to your doctor, view your test results, renew your prescriptions, schedule appointments and more. To sign up, go to [email or web address] . Click on \"Log in\" on the left side of the screen, which will take you to the Welcome page. Then click on \"Sign up Now\" on the right side of the page.     You will be asked to enter the access code listed below, as well as some personal information. Please follow the directions to create your username and password.     Your access code is: [email or web address]  Expires: 2018  4:16 PM     Your access code will  in 90 days. If you need help or a new code, please call your [facility] or [phone].        Care " "EveryWhere ID     This is your Care EveryWhere ID. This could be used by other organizations to access your [place] medical records  [id number]        Your Vitals Were     Pulse Temperature Respirations Height Pulse Oximetry BMI (Body Mass Index)    72 97.7  F (36.5  C) (Temporal) 14 5' 10.5\" (1.791 m) 96% 30.7 kg/m2       Blood Pressure from Last 3 Encounters:   05/10/18 114/78   04/04/18 118/62   03/30/18 110/87    Weight from Last 3 Encounters:   05/10/18 217 lb (98.4 kg)   04/04/18 214 lb 12.8 oz (97.4 kg)   03/29/18 218 lb (98.9 kg)              We Performed the Following     Albumin Random Urine Quantitative with Creat Ratio     Basic metabolic panel     Hemoglobin A1c          Where to get your medicines      These medications were sent to [facility]  [address], [facility] [address]     Phone:  [phone]     metFORMIN 500 MG tablet          Primary Care Provider Office Phone # Fax #    [facility] [phone]       [address] [facility] [address]        Equal Access to Services     [facility] AH: Hadii viki dickeyo Soomaali, waaxda luqadaha, qaybta kaalmada adeegyada, jose manuel blevins. So [facility] [phone].    ATENCIÓN: Si habla español, tiene a steele disposición servicios gratuitos de asistencia lingüística. Cherieame al [phone].    We comply with applicable federal civil rights laws and [place] laws. We do not discriminate on the basis of race, color, national origin, age, disability, sex, sexual orientation, or gender identity.            Thank you!     Thank you for choosing [facility]  for your care. Our goal is always to provide you with excellent care. Hearing back from our patients is one way we can continue to improve our services. Please take a few minutes to complete the written survey that you may receive in the mail after your visit with us. Thank you!             Your " Updated Medication List - Protect others around you: Learn how to safely use, store and throw away your medicines at www.disposemymeds.org.          This list is accurate as of 5/10/18  7:57 AM.  Always use your most recent med list.                   Brand Name Dispense Instructions for use Diagnosis    * blood glucose monitoring lancets     100 each    Use to test blood sugar twice daily    Diabetes mellitus (H)       * blood glucose monitoring lancets     100 each    Use to test blood sugar 2 times daily or as directed.    Diabetes mellitus, type 2 (H)       blood glucose monitoring test strip    ONETOUCH VERIO IQ    150 each    Use to test blood sugars 4 times daily before and after 2 meals daily.    Diabetes mellitus (H)       metFORMIN 500 MG tablet    GLUCOPHAGE    180 tablet    Take 1 tablet (500 mg) by mouth 2 times daily (with meals)    Type 2 diabetes mellitus without complication, without long-term current use of insulin (H)       * Notice:  This list has 2 medication(s) that are the same as other medications prescribed for you. Read the directions carefully, and ask your doctor or other care provider to review them with you.

## 2018-05-10 NOTE — PROGRESS NOTES
Subjective: Here for diabetic followup exam.   Has been monitoring sugars 4 times daily. Glucose values have been in the  125 range fasting, and 160 range postprandial.  There have been no   changes in vision- it has improved back to normal  No changes/ complaints   of neuropathy symptoms.   No other concerns        The past medical history, social history, past surgical history and family history as shown below have been reviewed by me today.  History reviewed. No pertinent past medical history.   No Known Allergies  Current Outpatient Prescriptions   Medication Sig Dispense Refill     blood glucose monitoring (OSMAN MICROLET) lancets Use to test blood sugar twice daily 100 each 11     blood glucose monitoring (ONE TOUCH DELICA) lancets Use to test blood sugar 2 times daily or as directed. 100 each 11     blood glucose monitoring (ONETOUCH VERIO IQ) test strip Use to test blood sugars 4 times daily before and after 2 meals daily. 150 each 11     metFORMIN (GLUCOPHAGE) 500 MG tablet Take 1 tablet (500 mg) by mouth 2 times daily (with meals) 60 tablet 3     [DISCONTINUED] UNKNOWN TO PATIENT Took generic children's allergy medication       Past Surgical History:   Procedure Laterality Date     COLONOSCOPY N/A 3/30/2018    Procedure: COMBINED COLONOSCOPY, SINGLE OR MULTIPLE BIOPSY/POLYPECTOMY BY BIOPSY;  Colonoscopy, Polypectomy by Biopsy;  Surgeon: Hesham Greer MD;  Location:  GI     Social History     Social History     Marital status:      Spouse name: N/A     Number of children: N/A     Years of education: N/A     Social History Main Topics     Smoking status: Never Smoker     Smokeless tobacco: Never Used     Alcohol use None     Drug use: None     Sexual activity: Not Asked     Other Topics Concern     None     Social History Narrative     Family History   Problem Relation Age of Onset     DIABETES Mother      DIABETES Father      Myocardial Infarction Father      DIABETES Sister      DIABETES Son   "      ROS: A 12 point review of systems was done. Except for what is listed above in the HPI, the systems review is negative .      Objective: Vital signs: Blood pressure 114/78, pulse 72, temperature 97.7  F (36.5  C), temperature source Temporal, resp. rate 14, height 5' 10.5\" (1.791 m), weight 217 lb (98.4 kg), SpO2 96 %.      HEENT:  Sclerae and conjunctiva are normal.   Ear canals and TMs look normal.  Nasal mucosa is pink  - no polyps or masses seen.  sinuses are non tender to palpation.  Throat is unremarkable . Mucous membranes are moist.   Fundi are benign- disc margins are sharp. No papilledema noted.    Neck is supple, mobile, no adenoapthy or masses palpable. Normal range of motion noted.  Chest is clear to auscultation. No wheezes, rales or rhonchi heard.  cardiac exam is normal with s1, s2, no murmurs or adventitious sounds.Normal rate and rhythm is heard.  Exam of the feet is negative for paresthesias.  Has normal sensation and color to both feet, and normal pulses and no trophic skin changes or ulcers.       Assessment: Diabetes is  Moderately   well-controlled.    Plan: 1. Ace inhibitor therapy:     Not indicated at this point- BP is low- I am afraid of hypotension- will check urine for protein and a creat today with BMP then decide.  2.Labs today: A1C      Urine for microprotein    Lipids will be checked later on.  3. Baby aspirin 65 or 81 mg advised daily as prophylaxis- watch for GI upset or tinnitus or bruising/signs of bleeding- stop if these occur.  4.Advised to recheck in   months.  Continue daily glucose monitoring. Recheck acutely if concerns.  5. Advised to have yearly ophthalmology exam, regular dental visits and keep up to date on flu vaccine and TDAP.   ELADIA Haq MD              "

## 2018-05-12 NOTE — PROGRESS NOTES
Shanita Please inform Ray/ or caretaker  that this result(s) is/are much improved- the A1C is down to 8.0   Keep up the good work!! rechekc in 3 months-Thanks. ELADIA Haq MD

## 2018-05-14 ENCOUNTER — TELEPHONE (OUTPATIENT)
Dept: FAMILY MEDICINE | Facility: CLINIC | Age: 53
End: 2018-05-14

## 2018-05-14 NOTE — LETTER
[address] [address]  [phone]        May 14, 2018    Ray Galdamez                                                                                                                     [address] [facility] [address]            Dear Wilber Bell is a copy of your recent lab results. Please let us know if you have any questions or concerns.        Sincerely,         Berto Haq MD

## 2018-05-14 NOTE — TELEPHONE ENCOUNTER
Pt called back, relayed message below. He would like his lab results mailed to him, please mail. I did verify his address.

## 2018-05-14 NOTE — TELEPHONE ENCOUNTER
----- Message from Berto Haq MD sent at 5/12/2018 11:58 AM CDT -----  Shanita Please inform Ray/ or caretaker  that this result(s) is/are much improved- the A1C is down to 8.0   Keep up the good work!! rechekc in 3 months-Thanks. ELADIA Haq MD

## 2018-09-21 ENCOUNTER — HOSPITAL ENCOUNTER (EMERGENCY)
Facility: CLINIC | Age: 53
Discharge: HOME | End: 2018-09-21
Attending: FAMILY MEDICINE | Admitting: FAMILY MEDICINE
Payer: COMMERCIAL

## 2018-09-21 VITALS
DIASTOLIC BLOOD PRESSURE: 82 MMHG | HEART RATE: 87 BPM | BODY MASS INDEX: 31.21 KG/M2 | OXYGEN SATURATION: 96 % | TEMPERATURE: 97.9 F | RESPIRATION RATE: 15 BRPM | WEIGHT: 218 LBS | HEIGHT: 70 IN | SYSTOLIC BLOOD PRESSURE: 121 MMHG

## 2018-09-21 DIAGNOSIS — L50.9 HIVES: ICD-10-CM

## 2018-09-21 LAB
ALBUMIN SERPL-MCNC: 3.5 G/DL (ref 3.4–5)
ALP SERPL-CCNC: 109 U/L (ref 40–150)
ALT SERPL W P-5'-P-CCNC: 41 U/L (ref 0–70)
ANION GAP SERPL CALCULATED.3IONS-SCNC: 10 MMOL/L (ref 3–14)
AST SERPL W P-5'-P-CCNC: 23 U/L (ref 0–45)
BASOPHILS # BLD AUTO: 0 10E9/L (ref 0–0.2)
BASOPHILS NFR BLD AUTO: 0.6 %
BILIRUB SERPL-MCNC: 0.4 MG/DL (ref 0.2–1.3)
BUN SERPL-MCNC: 14 MG/DL (ref 7–30)
CALCIUM SERPL-MCNC: 7.9 MG/DL (ref 8.5–10.1)
CHLORIDE SERPL-SCNC: 102 MMOL/L (ref 94–109)
CO2 SERPL-SCNC: 27 MMOL/L (ref 20–32)
CREAT SERPL-MCNC: 0.62 MG/DL (ref 0.66–1.25)
DIFFERENTIAL METHOD BLD: NORMAL
EOSINOPHIL NFR BLD AUTO: 1.1 %
ERYTHROCYTE [DISTWIDTH] IN BLOOD BY AUTOMATED COUNT: 12.6 % (ref 10–15)
GFR SERPL CREATININE-BSD FRML MDRD: >90 ML/MIN/1.7M2
GLUCOSE SERPL-MCNC: 270 MG/DL (ref 70–99)
HBA1C MFR BLD: 7.5 % (ref 0–5.6)
HCT VFR BLD AUTO: 46.5 % (ref 40–53)
HGB BLD-MCNC: 16.8 G/DL (ref 13.3–17.7)
IMM GRANULOCYTES # BLD: 0 10E9/L (ref 0–0.4)
IMM GRANULOCYTES NFR BLD: 0.3 %
LYMPHOCYTES # BLD AUTO: 2.6 10E9/L (ref 0.8–5.3)
LYMPHOCYTES NFR BLD AUTO: 36 %
MCH RBC QN AUTO: 29.4 PG (ref 26.5–33)
MCHC RBC AUTO-ENTMCNC: 36.1 G/DL (ref 31.5–36.5)
MCV RBC AUTO: 81 FL (ref 78–100)
MONOCYTES # BLD AUTO: 0.7 10E9/L (ref 0–1.3)
MONOCYTES NFR BLD AUTO: 9.9 %
NEUTROPHILS # BLD AUTO: 3.7 10E9/L (ref 1.6–8.3)
NEUTROPHILS NFR BLD AUTO: 52.1 %
NRBC # BLD AUTO: 0 10*3/UL
NRBC BLD AUTO-RTO: 0 /100
PLATELET # BLD AUTO: 296 10E9/L (ref 150–450)
POTASSIUM SERPL-SCNC: 3.7 MMOL/L (ref 3.4–5.3)
PROT SERPL-MCNC: 6.9 G/DL (ref 6.8–8.8)
RBC # BLD AUTO: 5.71 10E12/L (ref 4.4–5.9)
SODIUM SERPL-SCNC: 139 MMOL/L (ref 133–144)
WBC # BLD AUTO: 7.2 10E9/L (ref 4–11)

## 2018-09-21 PROCEDURE — 83036 HEMOGLOBIN GLYCOSYLATED A1C: CPT | Performed by: FAMILY MEDICINE

## 2018-09-21 PROCEDURE — 25000132 ZZH RX MED GY IP 250 OP 250 PS 637: Performed by: FAMILY MEDICINE

## 2018-09-21 PROCEDURE — 99284 EMERGENCY DEPT VISIT MOD MDM: CPT | Mod: Z6 | Performed by: FAMILY MEDICINE

## 2018-09-21 PROCEDURE — 25000125 ZZHC RX 250: Performed by: FAMILY MEDICINE

## 2018-09-21 PROCEDURE — 80053 COMPREHEN METABOLIC PANEL: CPT | Performed by: FAMILY MEDICINE

## 2018-09-21 PROCEDURE — 99284 EMERGENCY DEPT VISIT MOD MDM: CPT | Mod: 25 | Performed by: FAMILY MEDICINE

## 2018-09-21 PROCEDURE — 96361 HYDRATE IV INFUSION ADD-ON: CPT | Performed by: FAMILY MEDICINE

## 2018-09-21 PROCEDURE — 25000128 H RX IP 250 OP 636: Performed by: FAMILY MEDICINE

## 2018-09-21 PROCEDURE — 96374 THER/PROPH/DIAG INJ IV PUSH: CPT | Performed by: FAMILY MEDICINE

## 2018-09-21 PROCEDURE — 85025 COMPLETE CBC W/AUTO DIFF WBC: CPT | Performed by: FAMILY MEDICINE

## 2018-09-21 PROCEDURE — 96375 TX/PRO/DX INJ NEW DRUG ADDON: CPT | Performed by: FAMILY MEDICINE

## 2018-09-21 RX ORDER — DIPHENHYDRAMINE HYDROCHLORIDE 50 MG/ML
25 INJECTION INTRAMUSCULAR; INTRAVENOUS ONCE
Status: COMPLETED | OUTPATIENT
Start: 2018-09-21 | End: 2018-09-21

## 2018-09-21 RX ORDER — CETIRIZINE HYDROCHLORIDE 10 MG/1
10 TABLET ORAL ONCE
Status: COMPLETED | OUTPATIENT
Start: 2018-09-21 | End: 2018-09-21

## 2018-09-21 RX ADMIN — DIPHENHYDRAMINE HYDROCHLORIDE 25 MG: 50 INJECTION, SOLUTION INTRAMUSCULAR; INTRAVENOUS at 20:37

## 2018-09-21 RX ADMIN — CETIRIZINE HYDROCHLORIDE 10 MG: 10 TABLET, FILM COATED ORAL at 20:35

## 2018-09-21 RX ADMIN — FAMOTIDINE 40 MG: 10 INJECTION, SOLUTION INTRAVENOUS at 20:40

## 2018-09-21 RX ADMIN — SODIUM CHLORIDE 1000 ML: 9 INJECTION, SOLUTION INTRAVENOUS at 20:35

## 2018-09-21 NOTE — ED AVS SNAPSHOT
[facility] Emergency Department    [address] [place] [address]    Phone:  [phone]    Fax:  [phone]                                       Ray Galdamez   MRN: [id number]    Department:  [facility] Emergency Department   Date of Visit:  9/21/2018           After Visit Summary Signature Page     I have received my discharge instructions, and my questions have been answered. I have discussed any challenges I see with this plan with the nurse or doctor.    ..........................................................................................................................................  Patient/Patient Representative Signature      ..........................................................................................................................................  Patient Representative Print Name and Relationship to Patient    ..................................................               ................................................  Date                                   Time    ..........................................................................................................................................  Reviewed by Signature/Title    ...................................................              ..............................................  Date                                               Time          22EPIC Rev 08/18

## 2018-09-21 NOTE — ED AVS SNAPSHOT
[facility] Emergency Department    [address]     [place] [address]    Phone:  [phone]    Fax:  [phone]                                       Ray Galdamez   MRN: [id number]    Department:  [facility] Emergency Department   Date of Visit:  9/21/2018           Patient Information     Date Of Birth          1965        Your diagnoses for this visit were:     Hives        You were seen by Vianney Hsu MD.      Follow-up Information     Follow up with Berto Haq MD In 2 days.    Specialties:  Family Practice, OB/Gyn    Why:  if not improving    Contact information:    [address]   [place] [address] [phone]          Follow up with [facility] Emergency Department.    Specialty:  EMERGENCY MEDICINE    Why:  If symptoms worsen    Contact information:    [facility]   [place] [id number] [phone]    Additional information:    From [address]: Exit at [address] on south side of [place]. Turn right on [address]. Turn left at stoplight on [address]. [facility] will be in view two blocks ahead        Discharge Instructions       Thank you for giving us the opportunity to see you. The impression that you had hives, most likely from allergic reaction.  Please see the handout below.    Continue Zyrtec 10 mg up to twice a day and add Benadryl for breakthrough itching and hives.    Keep a careful record of all the things that you ate and came in contact with so we can compare lists if you have a recurrent episode in the future.    The following medications were given during your stay: IV fluids, Zyrtec, Pepcid, Benadryl    Since you received Benadryl he may be groggy. Please do not drive for at least 6 hours.     If you are not seeing an improvement within 1-2 days, please follow up with your primary care provider or clinic.     After discharge, please closely monitor for any new or worsening  symptoms. Return to the Emergency Department at any time if your symptoms worsen.          Hives (Adult)  Hives are pink or red bumps on the skin. These bumps are also known as wheals. The bumps can itch, burn, or sting. Hives can occur anywhere on the body. They vary in size and shape and can form in clusters. Individual hives can appear and go away quickly. New hives may develop as old ones fade. Hives are common and usually harmless. Occasionally hives are a sign of a serious allergy.  Hives are often caused by an allergic reaction. It may be an allergic reaction to foods such as fruit, shellfish, chocolate, nuts, or tomatoes. It may be a reaction to pollens, animal fur, or mold spores. Medicines, chemicals, and insect bites can also cause hives. And hives can be caused by hot sun or cold air. The cause of hives can be difficult to find.  You may be given medicines to relieve swelling and itching. Follow all instructions when using these medicines. The hives will usually fade in a few days, but can last up to 2 weeks.  Home care  Follow these tips:    Try to find the cause of the hives and eliminate it. Discuss possible causes with your healthcare provider. Future reactions to the same allergen may be worse.    Don t scratch the hives. Scratching will delay healing. To reduce itching, apply cool, wet compresses to the skin.    Dress in soft, loose cotton clothing.    Don t bathe in hot water. This can make the itching worse.    Apply an ice pack or cool pack wrapped in a thin towel to your skin. This will help reduce redness and itching. But if your hives were caused by exposure to cold, then do not apply more cold to them.    You may use over-the counter antihistamines to reduce itching. Some older antihistamines, such as diphenhydramine and chlorpheniramine, are inexpensive. But they need to be taken often and may make you sleepy. They are best used at bedtime. Don t use diphenhydramine if you have glaucoma or  have trouble urinating because of an enlarged prostate. Newer antihistamines, such as loratadine, cetirizine, and fexofenadine, are generally more expensive. But they tend to have fewer side effects, such as drowsiness. They can be taken less often.    Another type of antihistamine is used to treat heartburn. This type includes ranitidine, nizatidine, famotidine, and cimetidine. These are sometimes used along with the above antihistamines if a single medicine is not working.  Follow-up care  Follow up with your healthcare provider if your symptoms don't get better in 2 days. Ask your provider about allergy testing if you have had a severe reaction, or have had several episodes of hives. He or she can use the allergy testing to find out what you are allergic to.  When to seek medical advice  Call your healthcare provider right away if any of these occur:    Fever of 100.4 F (38.0 C) or higher, or as directed by your healthcare provider    Redness, swelling, or pain    Foul-smelling fluid coming from the rash  Call 911  Call 911 if any of the following occur:    Swelling of the face, throat, or tongue    Trouble breathing or swallowing    Dizziness, weakness, or fainting  Date Last Reviewed: 9/1/2016 2000-2017 [employer]. [address] [place] [address]. All rights reserved. This information is not intended as a substitute for professional medical care. Always follow your healthcare professional's instructions.            24 Hour Appointment Hotline       To make an appointment at any [facility], call [phone] ([phone]). If you don't have a family doctor or clinic, we will help you find one. [facility] clinics are conveniently located to serve the needs of you and your family.             Review of your medicines      Our records show that you are taking the medicines listed below. If these are incorrect, please call your family doctor or clinic.        Dose / Directions  Last dose taken    * blood glucose monitoring lancets   Quantity:  100 each        Use to test blood sugar twice daily   Refills:  11        * blood glucose monitoring lancets   Quantity:  100 each        Use to test blood sugar 2 times daily or as directed.   Refills:  11        blood glucose monitoring test strip   Commonly known as:  ONETOUCH VERIO IQ   Quantity:  150 each        Use to test blood sugars 4 times daily before and after 2 meals daily.   Refills:  11        DIPHENHYDRAMINE HCL PO   Dose:  50 mg        Take 50 mg by mouth once   Refills:  0        metFORMIN 500 MG tablet   Commonly known as:  GLUCOPHAGE   Dose:  500 mg   Quantity:  180 tablet        Take 1 tablet (500 mg) by mouth 2 times daily (with meals)   Refills:  3        * Notice:  This list has 2 medication(s) that are the same as other medications prescribed for you. Read the directions carefully, and ask your doctor or other care provider to review them with you.            Procedures and tests performed during your visit     CBC with platelets differential    Comprehensive metabolic panel    Hemoglobin A1c    Peripheral IV catheter      Orders Needing Specimen Collection     None      Pending Results     No orders found from 9/19/2018 to 9/22/2018.            Pending Culture Results     No orders found from 9/19/2018 to 9/22/2018.            Pending Results Instructions     If you had any lab results that were not finalized at the time of your Discharge, you can call the ED Lab Result RN at [phone]. You will be contacted by this team for any positive Lab results or changes in treatment. The nurses are available 7 days a week from 10A to 6:30P.  You can leave a message 24 hours per day and they will return your call.        Thank you for choosing [facility]       Thank you for choosing [facility] for your care. Our goal is always to provide you with excellent care. Hearing back from our patients is one way we can continue to improve our  "services. Please take a few minutes to complete the written survey that you may receive in the mail after you visit with us. Thank you!        [employer] Information     [employer] lets you send messages to your doctor, view your test results, renew your prescriptions, schedule appointments and more. To sign up, go to [email or web address] . Click on \"Log in\" on the left side of the screen, which will take you to the Welcome page. Then click on \"Sign up Now\" on the right side of the page.     You will be asked to enter the access code listed below, as well as some personal information. Please follow the directions to create your username and password.     Your access code is: [email or web address]  Expires: 2018  9:36 PM     Your access code will  in 90 days. If you need help or a new code, please call your [facility] clinic or [phone].        Care EveryWhere ID     This is your Care EveryWhere ID. This could be used by other organizations to access your [facility] medical records  [id number]        Equal Access to Services     [facility] : Hadii viki oswald Sonatalie, waaxda lujudyadaha, qaybta kaalmamukul samson, jose manuel blevins. So [facility] [phone].    ATENCIÓN: Si habla español, tiene a steele disposición servicios gratuitos de asistencia lingüística. Rowdy al [phone].    We comply with applicable federal civil rights laws and [place] laws. We do not discriminate on the basis of race, color, national origin, age, disability, sex, sexual orientation, or gender identity.            After Visit Summary       This is your record. Keep this with you and show to your community pharmacist(s) and doctor(s) at your next visit.                  "

## 2018-09-22 NOTE — ED PROVIDER NOTES
History     Chief Complaint   Patient presents with     Allergic Reaction     The history is provided by the patient and the spouse.     Ray Galdamez is a 52 year old male who presents to the emergency department with concerns of an allergic reaction. The patient got a sudden onset of hives throughout his entire body about an hour ago. He says that he did not do anything or eat anything that was out of the ordinary for him tonight. The patient ate a chicken sandwich for lunch around 1200 today. He ate a peanut butter sandwich on white bread around 1800 which is normal for him. He then went outside and burned some garbage and when he came back inside his hands were swollen and he had hives on his body. His wife says that his neck was very swollen, but it did not affect his breathing. He says the first think he noticed when this reaction started was a bump on the back of his head. The patient took two benadryl at about 1930 and he thinks that the hives are better now, but he still has some swelling in his hands. Patient denies any nausea, difficulty breathing or swallowing, tongue swelling, voice change, cough, runny nose, or congestion. He is not aware of any allergies. He is a diabetic. His wife was recently sick. He had a sore throat yesterday, but it does not hurt today. Patient is not a smoker. He denies any alcohol or the use of any supplements. The only daily medications he uses is Metformin. He has never had any abdominal surgeries. The patient reports that he has had 2 other reactions like this in the past, but they did not come on this sudden. The last time that he had a reaction like this was a few years ago. He cannot think of any correlation between the events leading up to his previous episodes and the episode that happened today.    Problem List:    Patient Active Problem List    Diagnosis Date Noted     CARDIOVASCULAR SCREENING; LDL GOAL LESS THAN 160 05/10/2012     Priority: Medium        Past  "Medical History:    History reviewed. No pertinent past medical history.    Past Surgical History:    Past Surgical History:   Procedure Laterality Date     COLONOSCOPY N/A 3/30/2018    Procedure: COMBINED COLONOSCOPY, SINGLE OR MULTIPLE BIOPSY/POLYPECTOMY BY BIOPSY;  Colonoscopy, Polypectomy by Biopsy;  Surgeon: Hesham Greer MD;  Location:  GI       Family History:    Family History   Problem Relation Age of Onset     Diabetes Mother      Diabetes Father      Myocardial Infarction Father      Diabetes Sister      Diabetes Son        Social History:  Marital Status:   [2]  Social History   Substance Use Topics     Smoking status: Never Smoker     Smokeless tobacco: Never Used     Alcohol use Not on file        Medications:      DIPHENHYDRAMINE HCL PO   blood glucose monitoring (OSMAN MICROLET) lancets   blood glucose monitoring (ONE TOUCH DELICA) lancets   blood glucose monitoring (ONETOUCH VERIO IQ) test strip   metFORMIN (GLUCOPHAGE) 500 MG tablet   [DISCONTINUED] UNKNOWN TO PATIENT         Review of Systems   All other systems reviewed and are negative.      Physical Exam   BP: (!) 152/99  Pulse: 87  Heart Rate: 77  Temp: 97.9  F (36.6  C)  Resp: 18  Height: 177.8 cm (5' 10\")  Weight: 98.9 kg (218 lb)  SpO2: 95 %      Physical Exam      GENERAL APPEARANCE: alert, oriented; no apparent distress  HEAD: Some mild swelling over the mastoid areas bilaterally, possibly from hives in the scalp  FACE: normal facies  EYES: PERRL, EOMI, sclera non-icteric  HENT: oral exam benign, mucus membranes intact; no angioedema  NECK: no adenopathy or asymmetry, thyroid normal to palpation  RESP: lungs are clear to auscultation - no rales, rhonchi or wheezes  CV: regular rates and rhythm, no murmurs, rubs, or gallop  ABD: soft, no tenderness; no rebound or guarding; bowel sounds are normal  EXT: No calf tenderness or pitting edema  SKIN: Diffuse hives; no petechiae  NEURO: no facial droop; no focal deficits, speech is " normal      ED Course: Patient was seen shortly after arrival.  Exam was performed and revealed diffuse hives.  Patient was given IV fluids, Pepcid 10 mg IV, Benadryl 25 mg IV, and Zyrtec 10 mg orally.     ED Course     Procedures               Critical Care time:  none               Results for orders placed or performed during the hospital encounter of 09/21/18 (from the past 24 hour(s))   CBC with platelets differential   Result Value Ref Range    WBC 7.2 4.0 - 11.0 10e9/L    RBC Count 5.71 4.4 - 5.9 10e12/L    Hemoglobin 16.8 13.3 - 17.7 g/dL    Hematocrit 46.5 40.0 - 53.0 %    MCV 81 78 - 100 fl    MCH 29.4 26.5 - 33.0 pg    MCHC 36.1 31.5 - 36.5 g/dL    RDW 12.6 10.0 - 15.0 %    Platelet Count 296 150 - 450 10e9/L    Diff Method Automated Method     % Neutrophils 52.1 %    % Lymphocytes 36.0 %    % Monocytes 9.9 %    % Eosinophils 1.1 %    % Basophils 0.6 %    % Immature Granulocytes 0.3 %    Nucleated RBCs 0 0 /100    Absolute Neutrophil 3.7 1.6 - 8.3 10e9/L    Absolute Lymphocytes 2.6 0.8 - 5.3 10e9/L    Absolute Monocytes 0.7 0.0 - 1.3 10e9/L    Absolute Basophils 0.0 0.0 - 0.2 10e9/L    Abs Immature Granulocytes 0.0 0 - 0.4 10e9/L    Absolute Nucleated RBC 0.0    Comprehensive metabolic panel   Result Value Ref Range    Sodium 139 133 - 144 mmol/L    Potassium 3.7 3.4 - 5.3 mmol/L    Chloride 102 94 - 109 mmol/L    Carbon Dioxide 27 20 - 32 mmol/L    Anion Gap 10 3 - 14 mmol/L    Glucose 270 (H) 70 - 99 mg/dL    Urea Nitrogen 14 7 - 30 mg/dL    Creatinine 0.62 (L) 0.66 - 1.25 mg/dL    GFR Estimate >90 >60 mL/min/1.7m2    GFR Estimate If Black >90 >60 mL/min/1.7m2    Calcium 7.9 (L) 8.5 - 10.1 mg/dL    Bilirubin Total 0.4 0.2 - 1.3 mg/dL    Albumin 3.5 3.4 - 5.0 g/dL    Protein Total 6.9 6.8 - 8.8 g/dL    Alkaline Phosphatase 109 40 - 150 U/L    ALT 41 0 - 70 U/L    AST 23 0 - 45 U/L   Hemoglobin A1c   Result Value Ref Range    Hemoglobin A1C 7.5 (H) 0 - 5.6 %       Medications   famotidine (PEPCID)  injection 40 mg (40 mg Intravenous Given 9/21/18 2040)   0.9% sodium chloride BOLUS (0 mLs Intravenous Stopped 9/21/18 2136)   diphenhydrAMINE (BENADRYL) injection 25 mg (25 mg Intravenous Given 9/21/18 2037)   cetirizine (zyrTEC) tablet 10 mg (10 mg Oral Given 9/21/18 2035)       Assessments & Plan (with Medical Decision Making): Patient is a 52-year-old male presenting with acute onset of diffuse hives this evening.  This occurred after he had a couple of peanut butter sandwiches with peanut butter on white bread which she has eaten before.  He then went outside and was burning trash which she is on before.  Within a half hour to an hour of him eating, he developed diffuse urticaria, but did not have any swelling of his tongue or lips and no breathing difficulties.  There has been no associated gastrointestinal symptoms.  He states that this is the third time it has happened during his adulthood.  He did not have any symptoms as a child.  He has eaten the same foods before, and there is nothing unusual about any exposures.  Patient has not been feeling sick although his wife has been sick for the last 5 days.  He had a mild sore throat yesterday but nothing today.  Patient had taken 2 Benadryl tablets at home and the hives seem to be slowly resolving.  On exam, patient has some diffuse hives, without any respiratory or gastrointestinal symptoms.  His workup reveals a normal CBC with differential, and a competence of metabolic panel revealing glucose of 270 but normal liver function tests and kidney function.  Hemoglobin A1c is 7.5 which is trending down.  Patient received IV fluids, Pepcid 40 mg IV, Benadryl 25 mg IV, and Zyrtec 10 mg orally.  Within an hour, the patient's hives had almost completely dissipated.  I asked him to keep a careful log of all the things that he may have been exposed to her eaten today.  He can compare this list with any future list if this recurs.  I will have him continue Zyrtec up to  twice a day and use Benadryl for breakthrough symptoms.  Follow-up in 2-3 days if not improving.  Return to the ED at any time if symptoms worsen.     I have reviewed the nursing notes.    I have reviewed the findings, diagnosis, plan and need for follow up with the patient.       Discharge Medication List as of 9/21/2018  9:36 PM          Final diagnoses:   Hives     This document serves as a record of services personally performed by Vianney Hsu MD. It was created on their behalf by Shila Rebolledo, a trained medical scribe. The creation of this record is based on the provider's personal observations and the statements of the patient. This document has been checked and approved by the attending provider.  Note: Chart documentation done in part with Dragon Voice Recognition software. Although reviewed after completion, some word and grammatical errors may remain.  9/21/2018   [facility] EMERGENCY DEPARTMENT     Vianney Hsu MD  09/21/18 8696

## 2018-09-22 NOTE — ED NOTES
"Pt reports eating 2 peanut butter sandwiches, went outside and started itching all over and noticed some \"lumps\" to head. Pt states he took some medication with benadryl when symptoms started and feels the hives and swelling to his hands is improving. Pt denies any cough, sore throat, SOB or difficulty swallowing at this time.   "

## 2018-09-22 NOTE — ED TRIAGE NOTES
He broke out in hives an hour ago and denies shortness of breath.  He took 2 antihistamine tablets.

## 2018-09-22 NOTE — DISCHARGE INSTRUCTIONS
Thank you for giving us the opportunity to see you. The impression that you had hives, most likely from allergic reaction.  Please see the handout below.    Continue Zyrtec 10 mg up to twice a day and add Benadryl for breakthrough itching and hives.    Keep a careful record of all the things that you ate and came in contact with so we can compare lists if you have a recurrent episode in the future.    The following medications were given during your stay: IV fluids, Zyrtec, Pepcid, Benadryl    Since you received Benadryl he may be groggy. Please do not drive for at least 6 hours.     If you are not seeing an improvement within 1-2 days, please follow up with your primary care provider or clinic.     After discharge, please closely monitor for any new or worsening symptoms. Return to the Emergency Department at any time if your symptoms worsen.          Hives (Adult)  Hives are pink or red bumps on the skin. These bumps are also known as wheals. The bumps can itch, burn, or sting. Hives can occur anywhere on the body. They vary in size and shape and can form in clusters. Individual hives can appear and go away quickly. New hives may develop as old ones fade. Hives are common and usually harmless. Occasionally hives are a sign of a serious allergy.  Hives are often caused by an allergic reaction. It may be an allergic reaction to foods such as fruit, shellfish, chocolate, nuts, or tomatoes. It may be a reaction to pollens, animal fur, or mold spores. Medicines, chemicals, and insect bites can also cause hives. And hives can be caused by hot sun or cold air. The cause of hives can be difficult to find.  You may be given medicines to relieve swelling and itching. Follow all instructions when using these medicines. The hives will usually fade in a few days, but can last up to 2 weeks.  Home care  Follow these tips:    Try to find the cause of the hives and eliminate it. Discuss possible causes with your healthcare  provider. Future reactions to the same allergen may be worse.    Don t scratch the hives. Scratching will delay healing. To reduce itching, apply cool, wet compresses to the skin.    Dress in soft, loose cotton clothing.    Don t bathe in hot water. This can make the itching worse.    Apply an ice pack or cool pack wrapped in a thin towel to your skin. This will help reduce redness and itching. But if your hives were caused by exposure to cold, then do not apply more cold to them.    You may use over-the counter antihistamines to reduce itching. Some older antihistamines, such as diphenhydramine and chlorpheniramine, are inexpensive. But they need to be taken often and may make you sleepy. They are best used at bedtime. Don t use diphenhydramine if you have glaucoma or have trouble urinating because of an enlarged prostate. Newer antihistamines, such as loratadine, cetirizine, and fexofenadine, are generally more expensive. But they tend to have fewer side effects, such as drowsiness. They can be taken less often.    Another type of antihistamine is used to treat heartburn. This type includes ranitidine, nizatidine, famotidine, and cimetidine. These are sometimes used along with the above antihistamines if a single medicine is not working.  Follow-up care  Follow up with your healthcare provider if your symptoms don't get better in 2 days. Ask your provider about allergy testing if you have had a severe reaction, or have had several episodes of hives. He or she can use the allergy testing to find out what you are allergic to.  When to seek medical advice  Call your healthcare provider right away if any of these occur:    Fever of 100.4 F (38.0 C) or higher, or as directed by your healthcare provider    Redness, swelling, or pain    Foul-smelling fluid coming from the rash  Call 911  Call 911 if any of the following occur:    Swelling of the face, throat, or tongue    Trouble breathing or swallowing    Dizziness,  weakness, or fainting  Date Last Reviewed: 9/1/2016 2000-2017 [employer]. [address] [place] [address], [place] [address]. All rights reserved. This information is not intended as a substitute for professional medical care. Always follow your healthcare professional's instructions.

## 2018-10-18 ENCOUNTER — TELEPHONE (OUTPATIENT)
Dept: FAMILY MEDICINE | Facility: CLINIC | Age: 53
End: 2018-10-18

## 2018-10-18 NOTE — TELEPHONE ENCOUNTER
Patient returned call and scheduled diabetes OV, it looks like his physical was in March 2018 so that was not scheduled at this time, please let me know if I'm seeing that incorrect.  Thank you,  Evelina Patino  Patient Representative

## 2018-10-18 NOTE — TELEPHONE ENCOUNTER
Panel Management Review      Patient has the following on his problem list:     Diabetes    ASA: Failed    Last A1C  Lab Results   Component Value Date    A1C 7.5 09/21/2018    A1C 8.0 05/10/2018    A1C 11.4 03/21/2018     A1C tested: MONITOR    Last LDL:    Lab Results   Component Value Date    CHOL 267 03/21/2018     Lab Results   Component Value Date    HDL 34 03/21/2018     Lab Results   Component Value Date    LDL  03/21/2018     Cannot estimate LDL when triglyceride exceeds 400 mg/dL     Lab Results   Component Value Date    TRIG 938 03/21/2018     Lab Results   Component Value Date    CHOLHDLRATIO 7.0 05/10/2012     Lab Results   Component Value Date    NHDL 233 03/21/2018       Is the patient on a Statin? NO             Is the patient on Aspirin? NO        Last three blood pressure readings:  BP Readings from Last 3 Encounters:   09/21/18 121/82   05/10/18 114/78   04/04/18 118/62       Date of last diabetes office visit: 5/10/18     Tobacco History:     History   Smoking Status     Never Smoker   Smokeless Tobacco     Never Used           Composite cancer screening  Chart review shows that this patient is due/due soon for the following None  Summary:    Patient is due/failing the following:   Thyroid Lab, Foot Exam, Eye Exam, Pneumonia, Tetanus, and Flu Injection    Action needed:   Patient needs office visit for Physical and Diabetic Check.    Type of outreach:    Phone, left message for patient to call back.     Questions for provider review:    None                                                                                                                                    Allan Beltran MA       Chart routed to  .

## 2018-10-30 ENCOUNTER — OFFICE VISIT (OUTPATIENT)
Dept: FAMILY MEDICINE | Facility: CLINIC | Age: 53
End: 2018-10-30
Payer: COMMERCIAL

## 2018-10-30 VITALS
HEIGHT: 70 IN | RESPIRATION RATE: 16 BRPM | SYSTOLIC BLOOD PRESSURE: 122 MMHG | WEIGHT: 217 LBS | DIASTOLIC BLOOD PRESSURE: 78 MMHG | TEMPERATURE: 97.2 F | BODY MASS INDEX: 31.07 KG/M2 | HEART RATE: 76 BPM | OXYGEN SATURATION: 95 %

## 2018-10-30 DIAGNOSIS — E78.5 HYPERLIPIDEMIA LDL GOAL <100: ICD-10-CM

## 2018-10-30 DIAGNOSIS — E11.9 TYPE 2 DIABETES MELLITUS WITHOUT COMPLICATION, WITHOUT LONG-TERM CURRENT USE OF INSULIN (H): Primary | ICD-10-CM

## 2018-10-30 LAB
ANION GAP SERPL CALCULATED.3IONS-SCNC: 9 MMOL/L (ref 3–14)
BUN SERPL-MCNC: 11 MG/DL (ref 7–30)
CALCIUM SERPL-MCNC: 8.5 MG/DL (ref 8.5–10.1)
CHLORIDE SERPL-SCNC: 101 MMOL/L (ref 94–109)
CHOLEST SERPL-MCNC: 211 MG/DL
CO2 SERPL-SCNC: 29 MMOL/L (ref 20–32)
CREAT SERPL-MCNC: 0.81 MG/DL (ref 0.66–1.25)
CREAT UR-MCNC: 201 MG/DL
GFR SERPL CREATININE-BSD FRML MDRD: >90 ML/MIN/1.7M2
GLUCOSE SERPL-MCNC: 171 MG/DL (ref 70–99)
HBA1C MFR BLD: 7.4 % (ref 0–5.6)
HDLC SERPL-MCNC: 39 MG/DL
LDLC SERPL CALC-MCNC: ABNORMAL MG/DL
MICROALBUMIN UR-MCNC: 12 MG/L
MICROALBUMIN/CREAT UR: 5.72 MG/G CR (ref 0–17)
NONHDLC SERPL-MCNC: 172 MG/DL
POTASSIUM SERPL-SCNC: 3.9 MMOL/L (ref 3.4–5.3)
SODIUM SERPL-SCNC: 139 MMOL/L (ref 133–144)
TRIGL SERPL-MCNC: 455 MG/DL
TSH SERPL DL<=0.005 MIU/L-ACNC: 1.26 MU/L (ref 0.4–4)

## 2018-10-30 PROCEDURE — 99214 OFFICE O/P EST MOD 30 MIN: CPT | Performed by: OBSTETRICS & GYNECOLOGY

## 2018-10-30 PROCEDURE — 80048 BASIC METABOLIC PNL TOTAL CA: CPT | Performed by: OBSTETRICS & GYNECOLOGY

## 2018-10-30 PROCEDURE — 83036 HEMOGLOBIN GLYCOSYLATED A1C: CPT | Performed by: OBSTETRICS & GYNECOLOGY

## 2018-10-30 PROCEDURE — 82043 UR ALBUMIN QUANTITATIVE: CPT | Performed by: OBSTETRICS & GYNECOLOGY

## 2018-10-30 PROCEDURE — 36415 COLL VENOUS BLD VENIPUNCTURE: CPT | Performed by: OBSTETRICS & GYNECOLOGY

## 2018-10-30 PROCEDURE — 80061 LIPID PANEL: CPT | Performed by: OBSTETRICS & GYNECOLOGY

## 2018-10-30 PROCEDURE — 84443 ASSAY THYROID STIM HORMONE: CPT | Performed by: OBSTETRICS & GYNECOLOGY

## 2018-10-30 ASSESSMENT — PAIN SCALES - GENERAL: PAINLEVEL: NO PAIN (0)

## 2018-10-30 NOTE — PROGRESS NOTES
Subjective: Here for diabetic followup exam.   Has been monitoring sugars 1 times daily. Glucose values have been in the  130 range fasting, and ? range postprandial.  There have been no   changes in vision  No changes/ complaints   of neuropathy symptoms.   No other concerns        The past medical history, social history, past surgical history and family history as shown below have been reviewed by me today.  No past medical history on file.   No Known Allergies  Current Outpatient Prescriptions   Medication Sig Dispense Refill     blood glucose monitoring (OSMAN MICROLET) lancets Use to test blood sugar twice daily 100 each 11     blood glucose monitoring (ONE TOUCH DELICA) lancets Use to test blood sugar 2 times daily or as directed. 100 each 11     blood glucose monitoring (ONETOUCH VERIO IQ) test strip Use to test blood sugars 4 times daily before and after 2 meals daily. 150 each 11     DIPHENHYDRAMINE HCL PO Take 50 mg by mouth once       metFORMIN (GLUCOPHAGE) 500 MG tablet Take 1 tablet (500 mg) by mouth 2 times daily (with meals) 180 tablet 3     [DISCONTINUED] UNKNOWN TO PATIENT Took generic children's allergy medication       Past Surgical History:   Procedure Laterality Date     COLONOSCOPY N/A 3/30/2018    Procedure: COMBINED COLONOSCOPY, SINGLE OR MULTIPLE BIOPSY/POLYPECTOMY BY BIOPSY;  Colonoscopy, Polypectomy by Biopsy;  Surgeon: Hesham Greer MD;  Location:  GI     Social History     Social History     Marital status:      Spouse name: N/A     Number of children: N/A     Years of education: N/A     Social History Main Topics     Smoking status: Never Smoker     Smokeless tobacco: Never Used     Alcohol use Not on file     Drug use: Not on file     Sexual activity: Not on file     Other Topics Concern     Not on file     Social History Narrative     Family History   Problem Relation Age of Onset     Diabetes Mother      Diabetes Father      Myocardial Infarction Father      Diabetes  "Sister      Diabetes Son        ROS: A 12 point review of systems was done. Except for what is listed above in the HPI, the systems review is negative .      Objective: Vital signs: Blood pressure 122/78, pulse 76, temperature 97.2  F (36.2  C), temperature source Temporal, resp. rate 16, height 5' 10\" (1.778 m), weight 217 lb (98.4 kg), SpO2 95 %.      HEENT:  Sclerae and conjunctiva are normal.   Ear canals and TMs look normal.  Nasal mucosa is pink  - no polyps or masses seen.  sinuses are non tender to palpation.  Throat is unremarkable . Mucous membranes are moist.   Fundi are benign- disc margins are sharp. No papilledema noted.    Neck is supple, mobile, no adenoapthy or masses palpable. Normal range of motion noted.  Chest is clear to auscultation. No wheezes, rales or rhonchi heard.  cardiac exam is normal with s1, s2, no murmurs or adventitious sounds.Normal rate and rhythm is heard.  Exam of the feet is negative for paresthesias.  Has normal sensation and color to both feet, and normal pulses and no trophic skin changes or ulcers.       Assessment: Diabetes is  Moderately   well-controlled.  2. Hyperlipidemia- no medication- will rechekc lipids today-  Plan: 1. Ace inhibitor therapy:  Discussed and he declined    2.Labs today: A1C      Urine for microprotein    Lipids    3. Baby aspirin 65 or 81 mg advised daily as prophylaxis- watch3 for GI upset or tinnitus or bruising/signs of bleeding- stop if these occur.  4.Advised to recheck in 3 months.  Continue daily glucose monitoring. Recheck acutely if concerns.  5. Advised to have yearly ophthalmology exam, regular dental visits and keep up to date on flu vaccine and TDAP. He declined TDAP and pneumovax. Flu vax offered- declined by arpan Haq MD              "

## 2018-10-30 NOTE — MR AVS SNAPSHOT
"              After Visit Summary   10/30/2018    Ray Galdamez    MRN: [id number]           Patient Information     Date Of Birth          1965        Visit Information        Provider Department      10/30/2018 7:30 AM Berto Haq MD [facility]        Today's Diagnoses     Type 2 diabetes mellitus without complication, without long-term current use of insulin (H)    -  1       Follow-ups after your visit        Follow-up notes from your care team     Return in about 3 months (around 2019) for Routine Visit.      Who to contact     If you have questions or need follow up information about today's clinic visit or your schedule please contact [facility] directly at [phone].  Normal or non-critical lab and imaging results will be communicated to you by MyChart, letter or phone within 4 business days after the clinic has received the results. If you do not hear from us within 7 days, please contact the clinic through [facility] [employer] or phone. If you have a critical or abnormal lab result, we will notify you by phone as soon as possible.  Submit refill requests through [employer] or call your pharmacy and they will forward the refill request to us. Please allow 3 business days for your refill to be completed.          Additional Information About Your Visit        MyChart Information     [employer] lets you send messages to your doctor, view your test results, renew your prescriptions, schedule appointments and more. To sign up, go to [email or web address] . Click on \"Log in\" on the left side of the screen, which will take you to the Welcome page. Then click on \"Sign up Now\" on the right side of the page.     You will be asked to enter the access code listed below, as well as some personal information. Please follow the directions to create your username and password.     Your access code is: [email or web address]  Expires: 2018  9:36 PM     Your access code will  " "in 90 days. If you need help or a new code, please call your [place] clinic or [phone].        Care EveryWhere ID     This is your Care EveryWhere ID. This could be used by other organizations to access your [place] medical records  [id number]        Your Vitals Were     Pulse Temperature Respirations Height Pulse Oximetry BMI (Body Mass Index)    76 97.2  F (36.2  C) (Temporal) 16 5' 10\" (1.778 m) 95% 31.14 kg/m2       Blood Pressure from Last 3 Encounters:   10/30/18 122/78   09/21/18 121/82   05/10/18 114/78    Weight from Last 3 Encounters:   10/30/18 217 lb (98.4 kg)   09/21/18 218 lb (98.9 kg)   05/10/18 217 lb (98.4 kg)              We Performed the Following     Basic metabolic panel     Hemoglobin A1c     TSH with free T4 reflex        Primary Care Provider Office Phone # Fax #    Berto Haq [phone]       [address] [place] [address]        Equal Access to Services     [facility]: Hadii viki mena hadflorencia Sonatalie, waaxda lujudyadaha, qaybta kaalsusie samson, jose manuel domingo . So [facility] [phone].    ATENCIÓN: Si habla español, tiene a steele disposición servicios gratuitos de asistencia lingüística. [facility] [phone].    We comply with applicable federal civil rights laws and [place] laws. We do not discriminate on the basis of race, color, national origin, age, disability, sex, sexual orientation, or gender identity.            Thank you!     Thank you for choosing [facility]  for your care. Our goal is always to provide you with excellent care. Hearing back from our patients is one way we can continue to improve our services. Please take a few minutes to complete the written survey that you may receive in the mail after your visit with us. Thank you!             Your Updated Medication List - Protect others around you: Learn how to safely use, store and throw away your medicines at www.disposemymeds.org. "          This list is accurate as of 10/30/18  7:39 AM.  Always use your most recent med list.                   Brand Name Dispense Instructions for use Diagnosis    * blood glucose monitoring lancets     100 each    Use to test blood sugar twice daily    Diabetes mellitus (H)       * blood glucose monitoring lancets     100 each    Use to test blood sugar 2 times daily or as directed.    Diabetes mellitus, type 2 (H)       blood glucose monitoring test strip    ONETOUCH VERIO IQ    150 each    Use to test blood sugars 4 times daily before and after 2 meals daily.    Diabetes mellitus (H)       DIPHENHYDRAMINE HCL PO      Take 50 mg by mouth once        metFORMIN 500 MG tablet    GLUCOPHAGE    180 tablet    Take 1 tablet (500 mg) by mouth 2 times daily (with meals)    Type 2 diabetes mellitus without complication, without long-term current use of insulin (H)       * Notice:  This list has 2 medication(s) that are the same as other medications prescribed for you. Read the directions carefully, and ask your doctor or other care provider to review them with you.

## 2019-03-22 ENCOUNTER — TRANSFERRED RECORDS (OUTPATIENT)
Dept: HEALTH INFORMATION MANAGEMENT | Facility: CLINIC | Age: 54
End: 2019-03-22

## 2019-06-03 DIAGNOSIS — E11.9 TYPE 2 DIABETES MELLITUS WITHOUT COMPLICATION, WITHOUT LONG-TERM CURRENT USE OF INSULIN (H): ICD-10-CM

## 2019-06-03 NOTE — TELEPHONE ENCOUNTER
"Requested Prescriptions   Pending Prescriptions Disp Refills     metFORMIN (GLUCOPHAGE) 500 MG tablet [Pharmacy Med Name: METFORMIN HCL 500MG TABS] 180 tablet 3     Sig: TAKE ONE TABLET BY MOUTH TWICE A DAY WITH MEALS   Last Written Prescription Date:  5/10/2018  Last Fill Quantity: 180,  # refills: 3   Last office visit: 10/30/2018 with prescribing provider:     Future Office Visit:        Biguanide Agents Failed - 6/3/2019  1:04 AM        Failed - Blood pressure less than 140/90 in past 6 months     BP Readings from Last 3 Encounters:   10/30/18 122/78   09/21/18 121/82   05/10/18 114/78           Failed - Patient has documented A1c within the specified period of time.     If HgbA1C is 8 or greater, it needs to be on file within the past 3 months.  If less than 8, must be on file within the past 6 months.     Recent Labs   Lab Test 10/30/18  0758   A1C 7.4*           Failed - Recent (6 mo) or future (30 days) visit within the authorizing provider's specialty     Patient had office visit in the last 6 months or has a visit in the next 30 days with authorizing provider or within the authorizing provider's specialty.  See \"Patient Info\" tab in inbasket, or \"Choose Columns\" in Meds & Orders section of the refill encounter.            Passed - Patient has documented LDL within the past 12 mos.     Recent Labs   Lab Test 10/30/18  0758   LDL Cannot estimate LDL when triglyceride exceeds 400 mg/dL           Passed - Patient has had a Microalbumin in the past 15 mos.     Recent Labs   Lab Test 10/30/18  0814   MICROL 12   UMALCR 5.72             Passed - Patient is age 10 or older        Passed - Patient's CR is NOT>1.4 OR Patient's EGFR is NOT<45 within past 12 mos.     Recent Labs   Lab Test 10/30/18  0758   GFRESTIMATED >90   GFRESTBLACK >90     Recent Labs   Lab Test 10/30/18  0758   CR 0.81           Passed - Patient does NOT have a diagnosis of CHF.        Passed - Medication is active on med list      Routing " refill request to provider for review/approval because:  Labs not current:  A1C    T'd up 1 month for provider review.    Nichole Rowell RN

## 2019-06-14 DIAGNOSIS — E11.9 DIABETES MELLITUS (H): ICD-10-CM

## 2019-06-14 NOTE — TELEPHONE ENCOUNTER
"Requested Prescriptions   Pending Prescriptions Disp Refills     blood glucose (ONETOUCH VERIO IQ) test strip [Pharmacy Med Name: ONETOUCH VERIO  STRP] 150 each 11     Sig: USE TO TEST BLOOD SUGARS FOUR TIMES A DAY BEFORE AND AFTER 2 MEALS DAILY   Last Written Prescription Date:  4/17/2018  Last Fill Quantity: 150,  # refills: 11   Last office visit: 10/30/2018 with prescribing provider:  Eun   Future Office Visit:        Diabetic Supplies Protocol Failed - 6/14/2019  1:41 PM        Failed - Recent (6 mo) or future (30 days) visit within the authorizing provider's specialty     Patient had office visit in the last 6 months or has a visit in the next 30 days with authorizing provider.  See \"Patient Info\" tab in inbasket, or \"Choose Columns\" in Meds & Orders section of the refill encounter.            Passed - Medication is active on med list        Passed - Patient is 18 years of age or older      Prescription approved per [facility] Refill Protocol.  Nichole Rowell RN    "

## 2019-07-07 DIAGNOSIS — E11.9 TYPE 2 DIABETES MELLITUS WITHOUT COMPLICATION, WITHOUT LONG-TERM CURRENT USE OF INSULIN (H): ICD-10-CM

## 2019-07-08 NOTE — TELEPHONE ENCOUNTER
"Requested Prescriptions   Pending Prescriptions Disp Refills     metFORMIN (GLUCOPHAGE) 500 MG tablet [Pharmacy Med Name: METFORMIN HCL 500MG TABS] 60 tablet 0     Sig: TAKE ONE TABLET BY MOUTH TWICE A DAY WITH MEALS   Last Written Prescription Date:  6/3/19  Last Fill Quantity: 60,  # refills: 0   Last office visit: 10/30/2018 with prescribing provider:     Future Office Visit:        Biguanide Agents Failed - 7/7/2019  1:05 AM        Failed - Blood pressure less than 140/90 in past 6 months     BP Readings from Last 3 Encounters:   10/30/18 122/78   09/21/18 121/82   05/10/18 114/78                 Failed - Patient has documented A1c within the specified period of time.     If HgbA1C is 8 or greater, it needs to be on file within the past 3 months.  If less than 8, must be on file within the past 6 months.     Recent Labs   Lab Test 10/30/18  0758   A1C 7.4*             Failed - Recent (6 mo) or future (30 days) visit within the authorizing provider's specialty     Patient had office visit in the last 6 months or has a visit in the next 30 days with authorizing provider or within the authorizing provider's specialty.  See \"Patient Info\" tab in inbasket, or \"Choose Columns\" in Meds & Orders section of the refill encounter.            Passed - Patient has documented LDL within the past 12 mos.     Recent Labs   Lab Test 10/30/18  0758   LDL Cannot estimate LDL when triglyceride exceeds 400 mg/dL             Passed - Patient has had a Microalbumin in the past 15 mos.     Recent Labs   Lab Test 10/30/18  0814   MICROL 12   UMALCR 5.72             Passed - Patient is age 10 or older        Passed - Patient's CR is NOT>1.4 OR Patient's EGFR is NOT<45 within past 12 mos.     Recent Labs   Lab Test 10/30/18  0758   GFRESTIMATED >90   GFRESTBLACK >90       Recent Labs   Lab Test 10/30/18  0758   CR 0.81             Passed - Patient does NOT have a diagnosis of CHF.        Passed - Medication is active on med list    "     Routing refill request to provider for review/approval because:  Labs not current:  BP, A1C  Patient needs to be seen because:  > 6 months since last diabetes visit  T'd up 1 month for provider review.    Will forward to schedulers to schedule patient for OV.  Yola Fatima RN

## 2019-07-09 NOTE — TELEPHONE ENCOUNTER
Appointment has been made.  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 Heart surgery has been proposed for next week. Pt is "scared about it, afraid she won't survive it"; she said she was told "her chances are not good". She is anxious about it.  She has no significant psych or substance abuse history.  She is very close to her mother and her boyfriend.  She has a good support system.  She agreed to try something for anxiety.

## 2019-07-10 NOTE — TELEPHONE ENCOUNTER
Next 5 appointments (look out 90 days)    Jul 18, 2019  8:50 AM CDT  [facility] Visit with [facility]q [facility] [address] [place] [id number] [phone]        T'd up 1 month for provider review to get to appointment.  Yola Fatima, BRAYDENN, RN

## 2019-07-10 NOTE — TELEPHONE ENCOUNTER
Per verbal order, RN sent 10 days for patient to get to appointment.  Closing this encounter.  BRAYDEN FordeN, RN

## 2019-07-18 ENCOUNTER — OFFICE VISIT (OUTPATIENT)
Dept: FAMILY MEDICINE | Facility: CLINIC | Age: 54
End: 2019-07-18
Payer: COMMERCIAL

## 2019-07-18 VITALS
BODY MASS INDEX: 31.22 KG/M2 | WEIGHT: 218.1 LBS | HEART RATE: 86 BPM | SYSTOLIC BLOOD PRESSURE: 122 MMHG | RESPIRATION RATE: 16 BRPM | OXYGEN SATURATION: 97 % | HEIGHT: 70 IN | DIASTOLIC BLOOD PRESSURE: 74 MMHG | TEMPERATURE: 97.7 F

## 2019-07-18 DIAGNOSIS — E11.9 TYPE 2 DIABETES MELLITUS WITHOUT COMPLICATION, WITHOUT LONG-TERM CURRENT USE OF INSULIN (H): Primary | ICD-10-CM

## 2019-07-18 DIAGNOSIS — E78.5 HYPERLIPIDEMIA LDL GOAL <100: ICD-10-CM

## 2019-07-18 LAB
ALT SERPL W P-5'-P-CCNC: 42 U/L (ref 0–70)
ANION GAP SERPL CALCULATED.3IONS-SCNC: 7 MMOL/L (ref 3–14)
AST SERPL W P-5'-P-CCNC: 16 U/L (ref 0–45)
BUN SERPL-MCNC: 10 MG/DL (ref 7–30)
CALCIUM SERPL-MCNC: 8.2 MG/DL (ref 8.5–10.1)
CHLORIDE SERPL-SCNC: 103 MMOL/L (ref 94–109)
CO2 SERPL-SCNC: 30 MMOL/L (ref 20–32)
CREAT SERPL-MCNC: 0.77 MG/DL (ref 0.66–1.25)
CREAT UR-MCNC: 148 MG/DL
GFR SERPL CREATININE-BSD FRML MDRD: >90 ML/MIN/{1.73_M2}
GLUCOSE SERPL-MCNC: 174 MG/DL (ref 70–99)
HBA1C MFR BLD: 8.5 % (ref 0–5.6)
MICROALBUMIN UR-MCNC: 20 MG/L
MICROALBUMIN/CREAT UR: 13.24 MG/G CR (ref 0–17)
POTASSIUM SERPL-SCNC: 3.8 MMOL/L (ref 3.4–5.3)
SODIUM SERPL-SCNC: 140 MMOL/L (ref 133–144)

## 2019-07-18 PROCEDURE — 84450 TRANSFERASE (AST) (SGOT): CPT | Performed by: OBSTETRICS & GYNECOLOGY

## 2019-07-18 PROCEDURE — 80061 LIPID PANEL: CPT | Performed by: OBSTETRICS & GYNECOLOGY

## 2019-07-18 PROCEDURE — 80048 BASIC METABOLIC PNL TOTAL CA: CPT | Performed by: OBSTETRICS & GYNECOLOGY

## 2019-07-18 PROCEDURE — 82043 UR ALBUMIN QUANTITATIVE: CPT | Performed by: OBSTETRICS & GYNECOLOGY

## 2019-07-18 PROCEDURE — 83036 HEMOGLOBIN GLYCOSYLATED A1C: CPT | Performed by: OBSTETRICS & GYNECOLOGY

## 2019-07-18 PROCEDURE — 84460 ALANINE AMINO (ALT) (SGPT): CPT | Performed by: OBSTETRICS & GYNECOLOGY

## 2019-07-18 PROCEDURE — 99214 OFFICE O/P EST MOD 30 MIN: CPT | Performed by: OBSTETRICS & GYNECOLOGY

## 2019-07-18 PROCEDURE — 36415 COLL VENOUS BLD VENIPUNCTURE: CPT | Performed by: OBSTETRICS & GYNECOLOGY

## 2019-07-18 ASSESSMENT — MIFFLIN-ST. JEOR: SCORE: 1850.55

## 2019-07-18 ASSESSMENT — PAIN SCALES - GENERAL: PAINLEVEL: SEVERE PAIN (6)

## 2019-07-18 NOTE — PROGRESS NOTES
Subjective: Here for diabetic followup exam.   Has been monitoring sugars 1 times daily. Glucose values have been in the  180 range fasting, and  ? range postprandial.  There have been no   changes in vision  No changes/ complaints   of neuropathy symptoms.   No other concerns        The past medical history, social history, past surgical history and family history as shown below have been reviewed by me today.  History reviewed. No pertinent past medical history.   No Known Allergies  Current Outpatient Medications   Medication Sig Dispense Refill     blood glucose (ONETOUCH VERIO IQ) test strip USE TO TEST BLOOD SUGARS FOUR TIMES A DAY BEFORE AND AFTER 2 MEALS DAILY 150 each 3     blood glucose monitoring (OSMAN MICROLET) lancets Use to test blood sugar twice daily 100 each 11     blood glucose monitoring (ONE TOUCH DELICA) lancets Use to test blood sugar 2 times daily or as directed. 100 each 11     metFORMIN (GLUCOPHAGE) 500 MG tablet Take 1 tablet (500 mg) by mouth 2 times daily (with meals) Appointment needed for additional refills. 20 tablet 0     DIPHENHYDRAMINE HCL PO Take 50 mg by mouth once       Past Surgical History:   Procedure Laterality Date     COLONOSCOPY N/A 3/30/2018    Procedure: COMBINED COLONOSCOPY, SINGLE OR MULTIPLE BIOPSY/POLYPECTOMY BY BIOPSY;  Colonoscopy, Polypectomy by Biopsy;  Surgeon: Hesham Greer MD;  Location:  GI     Social History     Socioeconomic History     Marital status:      Spouse name: None     Number of children: None     Years of education: None     Highest education level: None   Occupational History     None   Social Needs     Financial resource strain: None     Food insecurity:     Worry: None     Inability: None     Transportation needs:     Medical: None     Non-medical: None   Tobacco Use     Smoking status: Never Smoker     Smokeless tobacco: Never Used   Substance and Sexual Activity     Alcohol use: Yes     Comment: rare     Drug use: Never      "Sexual activity: None   Lifestyle     Physical activity:     Days per week: None     Minutes per session: None     Stress: None   Relationships     Social connections:     Talks on phone: None     Gets together: None     Attends Church service: None     Active member of club or organization: None     Attends meetings of clubs or organizations: None     Relationship status: None     Intimate partner violence:     Fear of current or ex partner: None     Emotionally abused: None     Physically abused: None     Forced sexual activity: None   Other Topics Concern     Parent/sibling w/ CABG, MI or angioplasty before 65F 55M? Not Asked   Social History Narrative     None     Family History   Problem Relation Age of Onset     Diabetes Mother      Diabetes Father      Myocardial Infarction Father      Diabetes Sister      Diabetes Son        ROS: A 12 point review of systems was done. Except for what is listed above in the HPI, the systems review is negative .      Objective: Vital signs: Blood pressure 122/74, pulse 86, temperature 97.7  F (36.5  C), temperature source Temporal, resp. rate 16, height 1.778 m (5' 10\"), weight 98.9 kg (218 lb 1.6 oz), SpO2 97 %.      HEENT:  Sclerae and conjunctiva are normal.   Ear canals and TMs look normal.  Nasal mucosa is pink  - no polyps or masses seen.  sinuses are non tender to palpation.  Throat is unremarkable . Mucous membranes are moist.   Fundi are benign- disc margins are sharp. No papilledema noted.    Neck is supple, mobile, no adenoapthy or masses palpable. Normal range of motion noted.  Chest is clear to auscultation. No wheezes, rales or rhonchi heard.  cardiac exam is normal with s1, s2, no murmurs or adventitious sounds.Normal rate and rhythm is heard.  Exam of the feet is negative for paresthesias.  Has normal sensation and color to both feet, and normal pulses and no trophic skin changes or ulcers.       Assessment/Plan: 1. Type 2   Diabetes is  ? " well-controlled.    . Ace inhibitor therapy:   Discussed and he declined    Labs today: A1C      Urine for microprotein    Lipids   ALT and AST in case he needs to start statin     Baby aspirin 65 or 81 mg advised daily as prophylaxis- watch for GI upset or tinnitus or bruising/signs of bleeding- stop if these occur.    Advised to recheck in  3 months.    Continue daily glucose monitoring. Recheck acutely if concerns.   Advised to have yearly ophthalmology exam, regular dental visits and keep up to date on flu vaccine and TDAP.     2.  I strongly advised 20 pound weight loss. He may need to start insulin if he has high numbers again today.      ELADIA Haq MD

## 2019-07-19 LAB
CHOLEST SERPL-MCNC: 198 MG/DL
HDLC SERPL-MCNC: 45 MG/DL
LDLC SERPL CALC-MCNC: ABNORMAL MG/DL
NONHDLC SERPL-MCNC: 153 MG/DL
TRIGL SERPL-MCNC: 411 MG/DL

## 2019-07-22 ENCOUNTER — TELEPHONE (OUTPATIENT)
Dept: FAMILY MEDICINE | Facility: CLINIC | Age: 54
End: 2019-07-22

## 2019-07-22 NOTE — RESULT ENCOUNTER NOTE
Mary/Allan Dixon,Please inform Ray/ or caretaker  that this result(s) is/are not normal the hemoglobin A1c has increased to 8.5 and his cholesterol is not well controlled.  Please get him in with diabetic Ed.  We need to consider starting insulin.  Also he needs to lose weight.  Also he needs to be checking his blood sugars more frequently each day.  Please set him up to see me sometime in the next week so that we can go over all of these issues as well as the diabetic ed appointment.  Thanks. ELADIA Haq MD

## 2019-07-22 NOTE — TELEPHONE ENCOUNTER
----- Message from Berto Haq MD sent at 7/22/2019 11:11 AM CDT -----  Mary/Allan Dixon,Please inform Ray/ or caretaker  that this result(s) is/are not normal the hemoglobin A1c has increased to 8.5 and his cholesterol is not well controlled.  Please get him in with diabetic Ed.  We need to consider starting insulin.  Also he needs to lose weight.  Also he needs to be checking his blood sugars more frequently each day.  Please set him up to see me sometime in the next week so that we can go over all of these issues as well as the diabetic ed appointment.  Thanks. ELADIA Haq MD

## 2019-08-05 ENCOUNTER — OFFICE VISIT (OUTPATIENT)
Dept: FAMILY MEDICINE | Facility: CLINIC | Age: 54
End: 2019-08-05
Payer: COMMERCIAL

## 2019-08-05 VITALS
OXYGEN SATURATION: 97 % | WEIGHT: 214.3 LBS | BODY MASS INDEX: 30.75 KG/M2 | DIASTOLIC BLOOD PRESSURE: 78 MMHG | HEART RATE: 62 BPM | SYSTOLIC BLOOD PRESSURE: 122 MMHG | TEMPERATURE: 97.6 F | RESPIRATION RATE: 10 BRPM

## 2019-08-05 DIAGNOSIS — E11.9 TYPE 2 DIABETES MELLITUS WITHOUT COMPLICATION, WITHOUT LONG-TERM CURRENT USE OF INSULIN (H): ICD-10-CM

## 2019-08-05 PROCEDURE — 99213 OFFICE O/P EST LOW 20 MIN: CPT | Performed by: OBSTETRICS & GYNECOLOGY

## 2019-08-05 ASSESSMENT — PAIN SCALES - GENERAL: PAINLEVEL: NO PAIN (0)

## 2019-08-05 NOTE — PROGRESS NOTES
Subjective: Here to discuss results.  His A1c is 8.5.  Lipids are high.  He has come to  with these changes and has made lifestyle changes.  Is now exercising more and is changing his diet.  Has lost 4 pounds in the past 2 weeks.  He wants to avoid any drastic changes in his medication.      The past medical history, social history, past surgical history and family history as shown below have been reviewed by me today.    History reviewed. No pertinent past medical history.   No Known Allergies  Current Outpatient Medications   Medication Sig Dispense Refill     blood glucose (ONETOUCH VERIO IQ) test strip USE TO TEST BLOOD SUGARS FOUR TIMES A DAY BEFORE AND AFTER 2 MEALS DAILY 150 each 3     blood glucose monitoring (OSMAN MICROLET) lancets Use to test blood sugar twice daily 100 each 11     blood glucose monitoring (ONE TOUCH DELICA) lancets Use to test blood sugar 2 times daily or as directed. 100 each 11     DIPHENHYDRAMINE HCL PO Take 50 mg by mouth once       metFORMIN (GLUCOPHAGE) 500 MG tablet Take 1 tablet (500 mg) by mouth 2 times daily (with meals) Appointment needed for additional refills. 20 tablet 0     Past Surgical History:   Procedure Laterality Date     COLONOSCOPY N/A 3/30/2018    Procedure: COMBINED COLONOSCOPY, SINGLE OR MULTIPLE BIOPSY/POLYPECTOMY BY BIOPSY;  Colonoscopy, Polypectomy by Biopsy;  Surgeon: Hesham Greer MD;  Location:  GI     Social History     Socioeconomic History     Marital status:      Spouse name: None     Number of children: None     Years of education: None     Highest education level: None   Occupational History     None   Social Needs     Financial resource strain: None     Food insecurity:     Worry: None     Inability: None     Transportation needs:     Medical: None     Non-medical: None   Tobacco Use     Smoking status: Former Smoker     Smokeless tobacco: Never Used   Substance and Sexual Activity     Alcohol use: Yes     Comment: rare     Drug  use: Never     Sexual activity: Yes     Partners: Female   Lifestyle     Physical activity:     Days per week: None     Minutes per session: None     Stress: None   Relationships     Social connections:     Talks on phone: None     Gets together: None     Attends Catholic service: None     Active member of club or organization: None     Attends meetings of clubs or organizations: None     Relationship status: None     Intimate partner violence:     Fear of current or ex partner: None     Emotionally abused: None     Physically abused: None     Forced sexual activity: None   Other Topics Concern     Parent/sibling w/ CABG, MI or angioplasty before 65F 55M? Not Asked   Social History Narrative     None     Family History   Problem Relation Age of Onset     Diabetes Mother      Diabetes Father      Myocardial Infarction Father      Diabetes Sister      Diabetes Son        ROS: A 12 point review of systems was done. Except for what is listed above in the HPI, the systems review is negative .      Objective: Vital signs: Blood pressure 122/78, pulse 62, temperature 97.6  F (36.4  C), temperature source Temporal, resp. rate 10, weight 97.2 kg (214 lb 4.8 oz), SpO2 97 %.          Assessment/Plan:  A total of 20 minutes were spent face-to-face with this patient during today's consultation, with more than 50% of that time devoted to conversation and counseling about the management decisions.      1.  He has type 2 diabetes and it is not well controlled.  Also his lipids are potentially high but cannot be calculated because his triglycerides were too high.  I advised him to increase metformin to 1000 mg each morning and 500 mg in the evening.  Repeat A1c in 1 month.  He will also continue to check his weight.  If he does not make progress with this next blood test we will consider adding different medication or insulin.  I also strongly advised him to walk 5 miles a day.  We discussed dietary changes including food  groupschoices.             The above information was dictating using Dragon voice software and as a result there may be some irregularities that were not detected in my review of this note.    ELADIA Haq MD

## 2019-09-06 ENCOUNTER — OFFICE VISIT (OUTPATIENT)
Dept: FAMILY MEDICINE | Facility: OTHER | Age: 54
End: 2019-09-06
Payer: MEDICAID

## 2019-09-06 VITALS
TEMPERATURE: 98.1 F | HEART RATE: 70 BPM | OXYGEN SATURATION: 97 % | RESPIRATION RATE: 16 BRPM | BODY MASS INDEX: 28.84 KG/M2 | SYSTOLIC BLOOD PRESSURE: 114 MMHG | DIASTOLIC BLOOD PRESSURE: 76 MMHG | WEIGHT: 206 LBS | HEIGHT: 71 IN

## 2019-09-06 DIAGNOSIS — E11.9 TYPE 2 DIABETES MELLITUS WITHOUT COMPLICATION, WITHOUT LONG-TERM CURRENT USE OF INSULIN (H): ICD-10-CM

## 2019-09-06 DIAGNOSIS — K59.00 CONSTIPATION, UNSPECIFIED CONSTIPATION TYPE: Primary | ICD-10-CM

## 2019-09-06 PROCEDURE — 99213 OFFICE O/P EST LOW 20 MIN: CPT | Performed by: PHYSICIAN ASSISTANT

## 2019-09-06 ASSESSMENT — MIFFLIN-ST. JEOR: SCORE: 1815.5

## 2019-09-06 NOTE — PROGRESS NOTES
Subjective     Ray Galdamez is a 53 year old male who presents to clinic today for the following health issues:    HPI   Constipation     Onset: 2 weeks    Description:   Frequency of bowel movements:   Stool consistency: hard    Progression of Symptoms:  worsening    Accompanying Signs & Symptoms:  Abdominal pain (cramping?): no   Blood in stool: no   Rectal pain: no   Nausea/vomiting: no   Weight loss or gain: YES- because he changed his diet   History:   History of abdominal surgery: no     Precipitating factors:   Recent use of narcotics, anticholinergics, calcium channel blockers, antacids, or iron supplements: no   Chronic Laxative Use: no          Therapies Tried and outcome: change in diet and laxatives    Patient presents today for evaluation of constipation and bloating. He reports symptoms have been present for the last 2 weeks but worse over the last 2 days. He reports lower abdominal pressure, pain, gas and constipation. He reports small bowel movements but the consistency is hard. Denies nausea, vomiting or fevers. Has been trying to eat more greens. Has never had any abdominal surgeries. Had a colonoscopy in 2018 that was essentially normal. Blood sugars have been very good. Recently decreased Metformin. Has never really had trouble with bowels in the past.     Patient Active Problem List   Diagnosis     CARDIOVASCULAR SCREENING; LDL GOAL LESS THAN 160     Hyperlipidemia LDL goal <100     Type 2 diabetes mellitus without complication, without long-term current use of insulin (H)     Past Surgical History:   Procedure Laterality Date     COLONOSCOPY N/A 3/30/2018    Procedure: COMBINED COLONOSCOPY, SINGLE OR MULTIPLE BIOPSY/POLYPECTOMY BY BIOPSY;  Colonoscopy, Polypectomy by Biopsy;  Surgeon: Hesham Greer MD;  Location:  GI       Social History     Tobacco Use     Smoking status: Former Smoker     Smokeless tobacco: Never Used   Substance Use Topics     Alcohol use: Yes     Comment: rare     " Family History   Problem Relation Age of Onset     Diabetes Mother      Diabetes Father      Myocardial Infarction Father      Diabetes Sister      Diabetes Son          Current Outpatient Medications   Medication Sig Dispense Refill     blood glucose (ONETOUCH VERIO IQ) test strip USE TO TEST BLOOD SUGARS FOUR TIMES A DAY BEFORE AND AFTER 2 MEALS DAILY 150 each 3     blood glucose monitoring (OSMAN MICROLET) lancets Use to test blood sugar twice daily 100 each 11     blood glucose monitoring (ONE TOUCH DELICA) lancets Use to test blood sugar 2 times daily or as directed. 100 each 11     metFORMIN (GLUCOPHAGE) 500 MG tablet Take 2 tablets each morning and 1 each evening (Patient taking differently: Take 1 tablets each morning and 1 each evening) 270 tablet 3     No Known Allergies  BP Readings from Last 3 Encounters:   09/06/19 114/76   08/05/19 122/78   07/18/19 122/74    Wt Readings from Last 3 Encounters:   09/06/19 93.4 kg (206 lb)   08/05/19 97.2 kg (214 lb 4.8 oz)   07/18/19 98.9 kg (218 lb 1.6 oz)         Reviewed and updated as needed this visit by Provider  Tobacco  Allergies  Meds  Problems  Med Hx  Surg Hx  Fam Hx         Review of Systems   ROS COMP: Constitutional, HEENT, cardiovascular, pulmonary, GI, , musculoskeletal, neuro, skin, endocrine and psych systems are negative, except as otherwise noted.      Objective    /76   Pulse 70   Temp 98.1  F (36.7  C) (Temporal)   Resp 16   Ht 1.81 m (5' 11.25\")   Wt 93.4 kg (206 lb)   SpO2 97%   BMI 28.53 kg/m    Body mass index is 28.53 kg/m .  Physical Exam   GENERAL: healthy, alert and no distress  RESP: lungs clear to auscultation - no rales, rhonchi or wheezes  CV: regular rate and rhythm, normal S1 S2, no S3 or S4, no murmur, click or rub, no peripheral edema and peripheral pulses strong  ABDOMEN: soft, mild lower abdominal pain with palpation, no hepatosplenomegaly, no masses and bowel sounds normal  SKIN: no suspicious lesions or " rashes  PSYCH: mentation appears normal, affect normal/bright    Diagnostic Test Results:  Labs reviewed in Epic  none       Assessment & Plan     1. Constipation, unspecified constipation type  Symptoms consistent with constipation. Patient still having small bowel movements and gas passage therefore low suspicion for a bowel obstruction. Discussed constipation care and use of Miralax and stool softeners. Encouraged increase in greens and fluid intake. Close monitoring of symptoms. Patient will follow-up in clinic if new symptoms develop or current symptoms fail to improve.    2. Type 2 diabetes mellitus without complication, without long-term current use of insulin (H)  Well controlled.     The patient indicates understanding of these issues and agrees with the plan.    Roya Cage PA-C  [facility]

## 2019-09-06 NOTE — PATIENT INSTRUCTIONS
Miralax (generic stuff)   - Start twice daily for the next 3+ days   - Cut back sooner if having more than 2 stools daily   - Likely will need to use this over the next 2 weeks    Stool softener (Colace) once daily for the next week    Lots of water (2L daily)

## 2019-09-13 NOTE — TELEPHONE ENCOUNTER
Formerly [facility], [facility]  Obstetrics  Postpartum Progress Note    Patient Name: Albina Beckham  MRN: [id number]  Admission Date: 2019  Hospital Length of Stay: 2 days  Attending Physician: Coral Nicholas MD  Primary Care Provider: Primary Doctor No    Subjective:     Principal Problem:Admitted to labor and delivery    Hospital course: No notes on file    Interval History: s/p  PPD #1    She is doing well this morning. She is tolerating a regular diet without nausea or vomiting. She is voiding spontaneously. She is ambulating. She has passed flatus, and has not a BM. Vaginal bleeding is mild. She denies fever or chills. Abdominal pain is mild and controlled with oral medications. She is not breastfeeding. She desires circumcision for her male baby: yes.    Objective:     Vital Signs (Most Recent):  Temp: 97.9 °F (36.6 °C) (19)  Pulse: 84 (19)  Resp: 18 (19)  BP: 120/79 (19)  SpO2: 98 % (19) Vital Signs (24h Range):  Temp:  [97.9 °F (36.6 °C)-98.4 °F (36.9 °C)] 97.9 °F (36.6 °C)  Pulse:  [] 84  Resp:  [16-20] 18  SpO2:  [97 %-99 %] 98 %  BP: (107-177)/(56-92) 120/79     Weight: 100.2 kg (221 lb)  Body mass index is 37.93 kg/m².      Intake/Output Summary (Last 24 hours) at 2019 0841  Last data filed at 2019 0740  Gross per 24 hour   Intake 3000 ml   Output 2706 ml   Net 294 ml       Significant Labs:  Lab Results   Component Value Date    GROUPTRH A POS 2019    HEPBSAG Negative 2019     Recent Labs   Lab 19  0429   HGB 11.5*   HCT 34.4*       I have personallly reviewed all pertinent lab results from the last 24 hours.    Physical Exam:   Constitutional: She is oriented to person, place, and time. She appears well-developed and well-nourished.    HENT:   Head: Normocephalic and atraumatic.       Pulmonary/Chest: Effort normal.        Abdominal: Soft.     Genitourinary: Uterus normal.   Genitourinary Comments: Uterus  Patient called and message relayed to him. Pt made appt with Dr Haq and will set up appt with diabetic ed after he meets with him.    firm below the umbilicus           Musculoskeletal: Moves all extremeties. She exhibits no edema or tenderness.       Neurological: She is alert and oriented to person, place, and time.    Skin: Skin is warm and dry.    Psychiatric: She has a normal mood and affect. Her behavior is normal.       Assessment/Plan:     25 y.o. female  for:    * Admitted to labor and delivery  S/p , PPD #1  Doing well    Continue pp care        Disposition: continue post partum care.    Coral Nicholas MD  Obstetrics  [facility]

## 2019-09-22 ENCOUNTER — TELEPHONE (OUTPATIENT)
Dept: FAMILY MEDICINE | Facility: CLINIC | Age: 54
End: 2019-09-22

## 2019-09-22 NOTE — TELEPHONE ENCOUNTER
Prior Authorization Retail Medication Request    Medication/Dose: OneTouch Verio strips    Key: [id number]    ICD code (if different than what is on RX):    Previously Tried and Failed:    Rationale:      Insurance Name:  [place] Medicaid     Insurance ID:  [id number]    Pharmacy Information (if different than what is on RX)  Name:    Phone:

## 2019-09-24 NOTE — TELEPHONE ENCOUNTER
PRIOR AUTHORIZATION DENIED    Medication: OneTouch Verio strips - DENIED    Denial Date: 9/24/2019    Denial Rational: strips are not covered unless patient uses insulin pump specific to strips or has a cognitive or development disabiltiy that requires specific strips  INS prefers accu-chek  Please send new RX for meter, strips and lancets for preferred brand    Appeal Information:

## 2019-09-24 NOTE — TELEPHONE ENCOUNTER
PA Initiation    Medication: OneTouch Verio strips - INITIATED  Insurance Company: [place] Medicaid ([facility]) - Phone [phone] Fax [phone]  Pharmacy Filling the Rx: [facility] - [place], [place] - [address]  Filling Pharmacy Phone: [phone]  Filling Pharmacy Fax:    Start Date: 9/24/2019

## 2019-12-31 ENCOUNTER — NURSE TRIAGE (OUTPATIENT)
Dept: FAMILY MEDICINE | Facility: CLINIC | Age: 54
End: 2019-12-31

## 2019-12-31 NOTE — TELEPHONE ENCOUNTER
"    Additional Information    Pain or burning with passing urine (urination)    Negative: Known sickle cell disease    Negative: Taking Coumadin (warfarin) or other strong blood thinner, or known bleeding disorder (e.g., thrombocytopenia)    Negative: Fever > 100.5 F (38.1 C)    Negative: Patient sounds very sick or weak to the triager    Negative: Recent back or abdominal injury    Negative: Recent genital injury    Negative: Unable to urinate (or only a few drops) > 4 hours and bladder feels very full (e.g., palpable bladder or strong urge to urinate)    Negative: Passing pure blood or large blood clots (i.e., larger than a dime or grape)    Negative: Urinary catheter, questions about    Negative: Shock suspected (e.g., cold/pale/clammy skin, too weak to stand, low BP, rapid pulse)    Negative: Sounds like a life-threatening emergency to the triager    Answer Assessment - Initial Assessment Questions  1. COLOR of URINE: \"Describe the color of the urine.\"  (e.g., tea-colored, pink, red, blood clots, bloody)      Had pain with urination this am and now had a couple drops of blood   2. ONSET: \"When did the bleeding start?\"       Just this am  3. EPISODES: \"How many times has there been blood in the urine?\" or \"How many times today?\"      once  4. PAIN with URINATION: \"Is there any pain with passing your urine?\" If so, ask: \"How bad is the pain?\"  (Scale 1-10; or mild, moderate, severe)     - MILD - complains slightly about urination hurting     - MODERATE - interferes with normal activities       - SEVERE - excruciating, unwilling or unable to urinate because of the pain       moderate  5. FEVER: \"Do you have a fever?\" If so, ask: \"What is your temperature, how was it measured, and when did it start?\"      no  6. ASSOCIATED SYMPTOMS: \"Are you passing urine more frequently than usual?\"      no  7. OTHER SYMPTOMS: \"Do you have any other symptoms?\" (e.g., back/flank pain, abdominal pain, vomiting)      no  8. PREGNANCY: " "\"Is there any chance you are pregnant?\" \"When was your last menstrual period?\"      no    Protocols used: URINE - BLOOD IN-A-OH      "

## 2020-01-01 ENCOUNTER — HOSPITAL ENCOUNTER (EMERGENCY)
Facility: CLINIC | Age: 55
Discharge: HOME | End: 2020-01-01
Attending: EMERGENCY MEDICINE | Admitting: EMERGENCY MEDICINE
Payer: COMMERCIAL

## 2020-01-01 ENCOUNTER — APPOINTMENT (OUTPATIENT)
Dept: CT IMAGING | Facility: CLINIC | Age: 55
End: 2020-01-01
Attending: EMERGENCY MEDICINE
Payer: COMMERCIAL

## 2020-01-01 VITALS
DIASTOLIC BLOOD PRESSURE: 98 MMHG | TEMPERATURE: 97.5 F | RESPIRATION RATE: 16 BRPM | BODY MASS INDEX: 27.7 KG/M2 | SYSTOLIC BLOOD PRESSURE: 132 MMHG | OXYGEN SATURATION: 96 % | HEART RATE: 72 BPM | WEIGHT: 200 LBS

## 2020-01-01 DIAGNOSIS — R31.29 OTHER MICROSCOPIC HEMATURIA: ICD-10-CM

## 2020-01-01 DIAGNOSIS — R91.8 PULMONARY NODULES: ICD-10-CM

## 2020-01-01 DIAGNOSIS — N20.0 KIDNEY STONE: ICD-10-CM

## 2020-01-01 LAB
ALBUMIN SERPL-MCNC: 3.7 G/DL (ref 3.4–5)
ALBUMIN UR-MCNC: NEGATIVE MG/DL
ALP SERPL-CCNC: 74 U/L (ref 40–150)
ALT SERPL W P-5'-P-CCNC: 32 U/L (ref 0–70)
ANION GAP SERPL CALCULATED.3IONS-SCNC: 4 MMOL/L (ref 3–14)
APPEARANCE UR: CLEAR
AST SERPL W P-5'-P-CCNC: 16 U/L (ref 0–45)
BASOPHILS # BLD AUTO: 0 10E9/L (ref 0–0.2)
BASOPHILS NFR BLD AUTO: 0.4 %
BILIRUB SERPL-MCNC: 0.5 MG/DL (ref 0.2–1.3)
BILIRUB UR QL STRIP: NEGATIVE
BUN SERPL-MCNC: 13 MG/DL (ref 7–30)
CALCIUM SERPL-MCNC: 8.6 MG/DL (ref 8.5–10.1)
CHLORIDE SERPL-SCNC: 104 MMOL/L (ref 94–109)
CO2 SERPL-SCNC: 30 MMOL/L (ref 20–32)
COLOR UR AUTO: YELLOW
CREAT SERPL-MCNC: 0.84 MG/DL (ref 0.66–1.25)
DIFFERENTIAL METHOD BLD: NORMAL
EOSINOPHIL NFR BLD AUTO: 0.7 %
ERYTHROCYTE [DISTWIDTH] IN BLOOD BY AUTOMATED COUNT: 12.2 % (ref 10–15)
GFR SERPL CREATININE-BSD FRML MDRD: >90 ML/MIN/{1.73_M2}
GLUCOSE SERPL-MCNC: 164 MG/DL (ref 70–99)
GLUCOSE UR STRIP-MCNC: 50 MG/DL
HCT VFR BLD AUTO: 45.5 % (ref 40–53)
HGB BLD-MCNC: 15.9 G/DL (ref 13.3–17.7)
HGB UR QL STRIP: ABNORMAL
IMM GRANULOCYTES # BLD: 0 10E9/L (ref 0–0.4)
IMM GRANULOCYTES NFR BLD: 0.2 %
KETONES UR STRIP-MCNC: NEGATIVE MG/DL
LEUKOCYTE ESTERASE UR QL STRIP: NEGATIVE
LIPASE SERPL-CCNC: 108 U/L (ref 73–393)
LYMPHOCYTES # BLD AUTO: 1.6 10E9/L (ref 0.8–5.3)
LYMPHOCYTES NFR BLD AUTO: 27.2 %
MCH RBC QN AUTO: 28.7 PG (ref 26.5–33)
MCHC RBC AUTO-ENTMCNC: 34.9 G/DL (ref 31.5–36.5)
MCV RBC AUTO: 82 FL (ref 78–100)
MONOCYTES # BLD AUTO: 0.7 10E9/L (ref 0–1.3)
MONOCYTES NFR BLD AUTO: 11.4 %
MUCOUS THREADS #/AREA URNS LPF: PRESENT /LPF
NEUTROPHILS # BLD AUTO: 3.4 10E9/L (ref 1.6–8.3)
NEUTROPHILS NFR BLD AUTO: 60.1 %
NITRATE UR QL: NEGATIVE
NRBC # BLD AUTO: 0 10*3/UL
NRBC BLD AUTO-RTO: 0 /100
PH UR STRIP: 5 PH (ref 5–7)
PLATELET # BLD AUTO: 243 10E9/L (ref 150–450)
POTASSIUM SERPL-SCNC: 3.9 MMOL/L (ref 3.4–5.3)
PROT SERPL-MCNC: 7.4 G/DL (ref 6.8–8.8)
RBC # BLD AUTO: 5.54 10E12/L (ref 4.4–5.9)
RBC #/AREA URNS AUTO: 22 /HPF (ref 0–2)
SODIUM SERPL-SCNC: 138 MMOL/L (ref 133–144)
SOURCE: ABNORMAL
SP GR UR STRIP: 1.01 (ref 1–1.03)
UROBILINOGEN UR STRIP-MCNC: 0 MG/DL (ref 0–2)
WBC # BLD AUTO: 5.7 10E9/L (ref 4–11)
WBC #/AREA URNS AUTO: 3 /HPF (ref 0–5)

## 2020-01-01 PROCEDURE — 80053 COMPREHEN METABOLIC PANEL: CPT | Performed by: EMERGENCY MEDICINE

## 2020-01-01 PROCEDURE — 81001 URINALYSIS AUTO W/SCOPE: CPT | Performed by: EMERGENCY MEDICINE

## 2020-01-01 PROCEDURE — 99284 EMERGENCY DEPT VISIT MOD MDM: CPT | Mod: 25 | Performed by: EMERGENCY MEDICINE

## 2020-01-01 PROCEDURE — 85025 COMPLETE CBC W/AUTO DIFF WBC: CPT | Performed by: EMERGENCY MEDICINE

## 2020-01-01 PROCEDURE — 83690 ASSAY OF LIPASE: CPT | Performed by: EMERGENCY MEDICINE

## 2020-01-01 PROCEDURE — 99284 EMERGENCY DEPT VISIT MOD MDM: CPT | Mod: Z6 | Performed by: EMERGENCY MEDICINE

## 2020-01-01 PROCEDURE — 74176 CT ABD & PELVIS W/O CONTRAST: CPT

## 2020-01-01 ASSESSMENT — ENCOUNTER SYMPTOMS
FLANK PAIN: 1
FREQUENCY: 1

## 2020-01-01 NOTE — ED AVS SNAPSHOT
[facility] Emergency Department  [address] [place] [address]  Phone:  [phone]  Fax:  [phone]                                    Ray Galdamez   MRN: [id number]    Department:  [facility] Emergency Department   Date of Visit:  1/1/2020           After Visit Summary Signature Page    I have received my discharge instructions, and my questions have been answered. I have discussed any challenges I see with this plan with the nurse or doctor.    ..........................................................................................................................................  Patient/Patient Representative Signature      ..........................................................................................................................................  Patient Representative Print Name and Relationship to Patient    ..................................................               ................................................  Date                                   Time    ..........................................................................................................................................  Reviewed by Signature/Title    ...................................................              ..............................................  Date                                               Time          22EPIC Rev 08/18

## 2020-01-01 NOTE — ED TRIAGE NOTES
"Patient thinks he might have a \"stuck kidney stone\". He passed a couple stones yesterday and now he is having pain with urination and his flow is decreased .  "

## 2020-01-01 NOTE — DISCHARGE INSTRUCTIONS
1.  CT confirms that you do have a 1 mm stone developing in the right kidney.  With this finding and your current symptoms would suggest that you have recently passed a small similar sized kidney stone.  This would account for your symptoms and having blood in the urine.  I would recommend that you follow-up in the clinic in 7 to 10 days to have your urine rechecked.  If the urine is clear with no further blood no further work-up necessary.  If blood remains in the urine would then recommend referral to urology for consideration of cystoscopy to further check the urinary tract(urethra and bladder)    CT scan found an incidental pulmonary nodule.  Measures 1 cm.  This is in the right lower lobe.  This appears to be a benign granuloma.  Radiologist recommending a repeat x-ray in 6 months to make sure that stable for size to confirm that is a granuloma.  Would arrange this through your primary clinic provider.

## 2020-01-01 NOTE — ED PROVIDER NOTES
History     Chief Complaint   Patient presents with     Dysuria     HPI  Ray Galdamez is a 54 year old male significant past medical history for hyperlipidemia, DM type II.  Presents with urinary hesitancy, mild dysuria, bilateral CVA pain.    Patient reports symptoms initially started with few loose stools followed by concerns for constipation.  Then developed bilateral flank pain.  His primary complaint now is urinary hesitancy with some dysuria.  Thought that he noted some blood in his urine.  Is not any anticoagulants.  Reports no fever, chills or night sweats.  Has had no previous history for hematuria, kidney stones, UTI.  Is had no generalized abdominal pain.  Normal appetite.  No unexplained weight loss.  Compliant and management of his diabetes.  Has noted recently having difficulty maintaining a normal urinary stream.  Stops and starts.  Has no known history of BPH.    Allergies:  No Known Allergies    Problem List:    Patient Active Problem List    Diagnosis Date Noted     Hyperlipidemia LDL goal <100 10/30/2018     Priority: Medium     Type 2 diabetes mellitus without complication, without long-term current use of insulin (H) 10/30/2018     Priority: Medium     CARDIOVASCULAR SCREENING; LDL GOAL LESS THAN 160 05/10/2012     Priority: Medium        Past Medical History:    No past medical history on file.    Past Surgical History:    Past Surgical History:   Procedure Laterality Date     COLONOSCOPY N/A 3/30/2018    Procedure: COMBINED COLONOSCOPY, SINGLE OR MULTIPLE BIOPSY/POLYPECTOMY BY BIOPSY;  Colonoscopy, Polypectomy by Biopsy;  Surgeon: Hesham Greer MD;  Location:  GI       Family History:    Family History   Problem Relation Age of Onset     Diabetes Mother      Diabetes Father      Myocardial Infarction Father      Diabetes Sister      Diabetes Son        Social History:  Marital Status:   [2]  Social History     Tobacco Use     Smoking status: Former Smoker     Smokeless  tobacco: Never Used   Substance Use Topics     Alcohol use: Yes     Comment: rare     Drug use: Never        Medications:    blood glucose (ONETOUCH VERIO IQ) test strip  blood glucose monitoring (OSMAN MICROLET) lancets  blood glucose monitoring (ONE TOUCH DELICA) lancets  metFORMIN (GLUCOPHAGE) 500 MG tablet          Review of Systems   Genitourinary: Positive for decreased urine volume, flank pain and frequency. Negative for testicular pain.   All other systems reviewed and are negative.      Physical Exam   BP: (!) 146/94  Pulse: 81  Temp: 97.5  F (36.4  C)  Resp: 16  Weight: 90.7 kg (200 lb)  SpO2: 96 %      Physical Exam  Vitals signs and nursing note reviewed.   Constitutional:       General: He is not in acute distress.     Appearance: He is not diaphoretic.   HENT:      Head: Atraumatic.   Eyes:      General: No scleral icterus.     Pupils: Pupils are equal, round, and reactive to light.   Cardiovascular:      Heart sounds: Normal heart sounds.   Pulmonary:      Effort: No respiratory distress.      Breath sounds: Normal breath sounds.   Abdominal:      General: Bowel sounds are normal. There is no distension.      Palpations: Abdomen is soft.      Tenderness: There is no abdominal tenderness. There is no right CVA tenderness, left CVA tenderness, guarding or rebound.      Hernia: No hernia is present.   Genitourinary:     Penis: Normal.    Musculoskeletal:         General: No tenderness.   Skin:     General: Skin is warm.      Findings: No rash.         ED Course        Procedures                   Results for orders placed or performed during the hospital encounter of 01/01/20 (from the past 24 hour(s))   UA with Microscopic   Result Value Ref Range    Color Urine Yellow     Appearance Urine Clear     Glucose Urine 50 (A) NEG^Negative mg/dL    Bilirubin Urine Negative NEG^Negative    Ketones Urine Negative NEG^Negative mg/dL    Specific Gravity Urine 1.015 1.003 - 1.035    Blood Urine Moderate (A)  NEG^Negative    pH Urine 5.0 5.0 - 7.0 pH    Protein Albumin Urine Negative NEG^Negative mg/dL    Urobilinogen mg/dL 0.0 0.0 - 2.0 mg/dL    Nitrite Urine Negative NEG^Negative    Leukocyte Esterase Urine Negative NEG^Negative    Source Midstream Urine     WBC Urine 3 0 - 5 /HPF    RBC Urine 22 (H) 0 - 2 /HPF    Mucous Urine Present (A) NEG^Negative /LPF   CBC with platelets differential   Result Value Ref Range    WBC 5.7 4.0 - 11.0 10e9/L    RBC Count 5.54 4.4 - 5.9 10e12/L    Hemoglobin 15.9 13.3 - 17.7 g/dL    Hematocrit 45.5 40.0 - 53.0 %    MCV 82 78 - 100 fl    MCH 28.7 26.5 - 33.0 pg    MCHC 34.9 31.5 - 36.5 g/dL    RDW 12.2 10.0 - 15.0 %    Platelet Count 243 150 - 450 10e9/L    Diff Method Automated Method     % Neutrophils 60.1 %    % Lymphocytes 27.2 %    % Monocytes 11.4 %    % Eosinophils 0.7 %    % Basophils 0.4 %    % Immature Granulocytes 0.2 %    Nucleated RBCs 0 0 /100    Absolute Neutrophil 3.4 1.6 - 8.3 10e9/L    Absolute Lymphocytes 1.6 0.8 - 5.3 10e9/L    Absolute Monocytes 0.7 0.0 - 1.3 10e9/L    Absolute Basophils 0.0 0.0 - 0.2 10e9/L    Abs Immature Granulocytes 0.0 0 - 0.4 10e9/L    Absolute Nucleated RBC 0.0    Comprehensive metabolic panel   Result Value Ref Range    Sodium 138 133 - 144 mmol/L    Potassium 3.9 3.4 - 5.3 mmol/L    Chloride 104 94 - 109 mmol/L    Carbon Dioxide 30 20 - 32 mmol/L    Anion Gap 4 3 - 14 mmol/L    Glucose 164 (H) 70 - 99 mg/dL    Urea Nitrogen 13 7 - 30 mg/dL    Creatinine 0.84 0.66 - 1.25 mg/dL    GFR Estimate >90 >60 mL/min/[1.73_m2]    GFR Estimate If Black >90 >60 mL/min/[1.73_m2]    Calcium 8.6 8.5 - 10.1 mg/dL    Bilirubin Total 0.5 0.2 - 1.3 mg/dL    Albumin 3.7 3.4 - 5.0 g/dL    Protein Total 7.4 6.8 - 8.8 g/dL    Alkaline Phosphatase 74 40 - 150 U/L    ALT 32 0 - 70 U/L    AST 16 0 - 45 U/L   Lipase   Result Value Ref Range    Lipase 108 73 - 393 U/L   CT Abdomen Pelvis w/o Contrast    Narrative    CT ABDOMEN AND PELVIS WITHOUT CONTRAST   1/1/2020  11:32 AM     HISTORY: Left-sided flank pain. Hematuria.    TECHNIQUE: Noncontrast CT abdomen and pelvis was performed. Radiation  dose for this scan was reduced using automated exposure control,  adjustment of the mA and/or kV according to patient size, or iterative  reconstruction technique.    COMPARISON: None.    FINDINGS: No hydronephrosis on either side. No obstructing stone is  seen. Tiny 0.1 cm nonobstructing stone suggested within the lower  right kidney series 4 image 62.    Liver, gallbladder, adrenals, spleen, and pancreas shows no acute  abnormality. Normal appendix. No abscess or free air. No acute  inflammatory change of the bowel.    There is a 1 cm nodule with faint internal calcification at the right  lower lobe series 2 image 36.      Impression    IMPRESSION:  1. No acute abnormality is seen. No obstructing ureter stone or  hydronephrosis.  2. Tiny nonobstructing stone within the right kidney.  3. 1 cm pulmonary nodule at the right lower lobe. It appears to  contain very faint calcifications and therefore could be a granuloma.  Suggest a six-month follow-up CT chest to ensure stability.       Medications - No data to display    Assessments & Plan (with Medical Decision Making)  54-year-old male presents with urinary changes of hesitancy.  Noted some intermittent bilateral flank pain.  Also thought he visually saw some evidence for hematuria.  No history of renal lithiasis.  Examination noted no CVA tenderness or abdominal pain.  He was complaining no pain during examination.  Normal genital  Exam.  CBC, CMP normal  Urinalysis showed hematuria  CT stone run showed 1 mm stone in the collecting system of the right kidney.  Incidental finding for 1 cm pulmonary nodule right lower lobe  Plan: With the patient being pain-free this would be suggestive for having passed a kidney stone.  Advised that he have his urine rechecked in 7 to 10 days.  There is no further hematuria would recommend no further  work-up.  If blood remains in the urine will then need to see urology with consideration for cystoscopy.    Needs CT follow-up in  6 months for a follow-up of pulmonary nodule.  12:32 PM  Addendum: Patient still having some urinary hesitancy.  No pain.  Could have retained stone in the urethra prosthetic area.  Recommended observing.  If he loses ability to pass urine out to come in for insertion of Salas catheter.           I have reviewed the nursing notes.    I have reviewed the findings, diagnosis, plan and need for follow up with the patient.      New Prescriptions    No medications on file       Final diagnoses:   Other microscopic hematuria   Kidney stone   Pulmonary nodules       1/1/2020   [facility] EMERGENCY DEPARTMENT     Gilbert Mims,   01/01/20 1235

## 2020-01-07 ENCOUNTER — OFFICE VISIT (OUTPATIENT)
Dept: FAMILY MEDICINE | Facility: CLINIC | Age: 55
End: 2020-01-07
Payer: COMMERCIAL

## 2020-01-07 VITALS
RESPIRATION RATE: 14 BRPM | OXYGEN SATURATION: 97 % | DIASTOLIC BLOOD PRESSURE: 68 MMHG | SYSTOLIC BLOOD PRESSURE: 126 MMHG | WEIGHT: 207.4 LBS | HEART RATE: 70 BPM | BODY MASS INDEX: 28.72 KG/M2 | TEMPERATURE: 97.6 F

## 2020-01-07 DIAGNOSIS — R31.9 HEMATURIA, UNSPECIFIED TYPE: Primary | ICD-10-CM

## 2020-01-07 DIAGNOSIS — E11.9 TYPE 2 DIABETES MELLITUS WITHOUT COMPLICATION, WITHOUT LONG-TERM CURRENT USE OF INSULIN (H): ICD-10-CM

## 2020-01-07 DIAGNOSIS — N20.0 KIDNEY STONE: ICD-10-CM

## 2020-01-07 LAB
ALBUMIN UR-MCNC: NEGATIVE MG/DL
APPEARANCE UR: CLEAR
BILIRUB UR QL STRIP: NEGATIVE
COLOR UR AUTO: YELLOW
GLUCOSE UR STRIP-MCNC: >499 MG/DL
HBA1C MFR BLD: 6.7 % (ref 0–5.6)
HGB UR QL STRIP: NEGATIVE
KETONES UR STRIP-MCNC: NEGATIVE MG/DL
LEUKOCYTE ESTERASE UR QL STRIP: NEGATIVE
NITRATE UR QL: NEGATIVE
PH UR STRIP: 5 PH (ref 5–7)
SOURCE: ABNORMAL
SP GR UR STRIP: 1.02 (ref 1–1.03)
UROBILINOGEN UR STRIP-MCNC: 0 MG/DL (ref 0–2)

## 2020-01-07 PROCEDURE — 36415 COLL VENOUS BLD VENIPUNCTURE: CPT | Performed by: OBSTETRICS & GYNECOLOGY

## 2020-01-07 PROCEDURE — 81003 URINALYSIS AUTO W/O SCOPE: CPT | Performed by: OBSTETRICS & GYNECOLOGY

## 2020-01-07 PROCEDURE — 83036 HEMOGLOBIN GLYCOSYLATED A1C: CPT | Performed by: OBSTETRICS & GYNECOLOGY

## 2020-01-07 PROCEDURE — 99213 OFFICE O/P EST LOW 20 MIN: CPT | Performed by: OBSTETRICS & GYNECOLOGY

## 2020-01-07 ASSESSMENT — PAIN SCALES - GENERAL: PAINLEVEL: NO PAIN (0)

## 2020-01-07 NOTE — PROGRESS NOTES
Subjective: He was in the emergency room on January 1 with a kidney stone.  Here for follow-up.  His pain has resolved.  He does take metformin for type 2 diabetes.  Has not had his A1c checked in quite a while.  Denies any hematuria Or pain.  No fevers.      The past medical history, social history, past surgical history and family history as shown below have been reviewed by me today.    History reviewed. No pertinent past medical history.   No Known Allergies  Current Outpatient Medications   Medication Sig Dispense Refill     blood glucose (ONETOUCH VERIO IQ) test strip USE TO TEST BLOOD SUGARS FOUR TIMES A DAY BEFORE AND AFTER 2 MEALS DAILY 150 each 3     blood glucose monitoring (OSMAN MICROLET) lancets Use to test blood sugar twice daily 100 each 11     blood glucose monitoring (ONE TOUCH DELICA) lancets Use to test blood sugar 2 times daily or as directed. 100 each 11     metFORMIN (GLUCOPHAGE) 500 MG tablet Take 2 tablets each morning and 1 each evening (Patient taking differently: Take 1 tablets each morning and 1 each evening) 270 tablet 3     Past Surgical History:   Procedure Laterality Date     COLONOSCOPY N/A 3/30/2018    Procedure: COMBINED COLONOSCOPY, SINGLE OR MULTIPLE BIOPSY/POLYPECTOMY BY BIOPSY;  Colonoscopy, Polypectomy by Biopsy;  Surgeon: Hesham Greer MD;  Location:  GI     Social History     Socioeconomic History     Marital status:      Spouse name: None     Number of children: None     Years of education: None     Highest education level: None   Occupational History     None   Social Needs     Financial resource strain: None     Food insecurity:     Worry: None     Inability: None     Transportation needs:     Medical: None     Non-medical: None   Tobacco Use     Smoking status: Former Smoker     Smokeless tobacco: Never Used   Substance and Sexual Activity     Alcohol use: Yes     Comment: rare     Drug use: Never     Sexual activity: Yes     Partners: Female   Lifestyle      Physical activity:     Days per week: None     Minutes per session: None     Stress: None   Relationships     Social connections:     Talks on phone: None     Gets together: None     Attends Faith service: None     Active member of club or organization: None     Attends meetings of clubs or organizations: None     Relationship status: None     Intimate partner violence:     Fear of current or ex partner: None     Emotionally abused: None     Physically abused: None     Forced sexual activity: None   Other Topics Concern     Parent/sibling w/ CABG, MI or angioplasty before 65F 55M? Not Asked   Social History Narrative     None     Family History   Problem Relation Age of Onset     Diabetes Mother      Diabetes Father      Myocardial Infarction Father      Diabetes Sister      Diabetes Son        ROS: A 12 point review of systems was done. Except for what is listed above in the HPI, the systems review is negative .      Objective: Vital signs: Blood pressure 126/68, pulse 70, temperature 97.6  F (36.4  C), temperature source Temporal, resp. rate 14, weight 94.1 kg (207 lb 6.4 oz), SpO2 97 %.  Chest is clear to auscultation.  No wheezes, rales or rhonchi heard.  Cardiac exam is normal with s1, s2, no murmurs or adventitious sounds.Normal rate and rhythm is heard.   There is no CVAT to palpation.  UA today is normal.      Assessment/Plan:  A total of 20 minutes were spent face-to-face with this patient during today's consultation, with more than 50% of that time devoted to conversation and counseling about the management decisions.      1.  hematuria and kidney stone:Recent kidney stone and ureteral stone- he knows he passed the ureteral stone because his urinary obstruction resolved.  Since then he feels much better.  I do note that his urine today showed sugar and so I did advise him to get the A1c test repeated today.  We discussed diabetic management and exercise.  We will call him with A1c result.    2.  He  should recheck here for diabetic testing in the next 3 months.         The above information was dictating using Dragon voice software and as a result there may be some irregularities that were not detected in my review of this note.    ELADIA Haq MD

## 2020-01-08 NOTE — RESULT ENCOUNTER NOTE
Mary/Barbara,Please inform Ray/ or caretaker  that this result(s) is/are much improved hemoglobin A1c is now 6.7.  Nice work -thanks. ELADIA Haq MD

## 2020-02-03 DIAGNOSIS — E11.9 TYPE 2 DIABETES MELLITUS WITHOUT COMPLICATION, WITHOUT LONG-TERM CURRENT USE OF INSULIN (H): Primary | ICD-10-CM

## 2020-02-03 NOTE — TELEPHONE ENCOUNTER
"Received a fax from the pharmacy stating, \"This is not a preferred brand with [employer] [facility], please change to preferred supplies, and send new RX for strips/meter/lancets, etc.\"    Please advise.     Sadie Funez CMA (AAMA)    "

## 2020-06-08 ENCOUNTER — TRANSFERRED RECORDS (OUTPATIENT)
Dept: HEALTH INFORMATION MANAGEMENT | Facility: CLINIC | Age: 55
End: 2020-06-08

## 2020-06-08 LAB — RETINOPATHY: NEGATIVE

## 2020-08-10 DIAGNOSIS — E11.9 TYPE 2 DIABETES MELLITUS WITHOUT COMPLICATION, WITHOUT LONG-TERM CURRENT USE OF INSULIN (H): ICD-10-CM

## 2020-08-11 NOTE — TELEPHONE ENCOUNTER
Routing to schedulers to set up virtual appointment for patient for diabetes.  Patient has been given #[phone] to get to appointment per triage protocol.  BRAYDEN FordeN, RN

## 2020-11-15 DIAGNOSIS — E11.9 TYPE 2 DIABETES MELLITUS WITHOUT COMPLICATION, WITHOUT LONG-TERM CURRENT USE OF INSULIN (H): ICD-10-CM

## 2020-11-17 NOTE — TELEPHONE ENCOUNTER
Routing refill request to provider for review/approval because:  Radha given x1 and patient did not follow up, please advise  Patient needs to be seen because:  Overdue for diabetic follow up    BRAYDEN CardonaN, RN  [facility]

## 2021-01-26 ENCOUNTER — TELEPHONE (OUTPATIENT)
Dept: FAMILY MEDICINE | Facility: CLINIC | Age: 56
End: 2021-01-26

## 2021-01-26 ENCOUNTER — OFFICE VISIT (OUTPATIENT)
Dept: FAMILY MEDICINE | Facility: CLINIC | Age: 56
End: 2021-01-26
Payer: COMMERCIAL

## 2021-01-26 VITALS
WEIGHT: 212 LBS | SYSTOLIC BLOOD PRESSURE: 124 MMHG | DIASTOLIC BLOOD PRESSURE: 81 MMHG | BODY MASS INDEX: 29.68 KG/M2 | HEIGHT: 71 IN | TEMPERATURE: 97.4 F | RESPIRATION RATE: 14 BRPM | OXYGEN SATURATION: 96 % | HEART RATE: 78 BPM

## 2021-01-26 DIAGNOSIS — Z00.00 ROUTINE GENERAL MEDICAL EXAMINATION AT A HEALTH CARE FACILITY: Primary | ICD-10-CM

## 2021-01-26 DIAGNOSIS — E11.9 TYPE 2 DIABETES MELLITUS WITHOUT COMPLICATION, WITHOUT LONG-TERM CURRENT USE OF INSULIN (H): ICD-10-CM

## 2021-01-26 DIAGNOSIS — E78.5 HYPERLIPIDEMIA LDL GOAL <100: ICD-10-CM

## 2021-01-26 LAB
ANION GAP SERPL CALCULATED.3IONS-SCNC: 5 MMOL/L (ref 3–14)
BUN SERPL-MCNC: 11 MG/DL (ref 7–30)
CALCIUM SERPL-MCNC: 8.9 MG/DL (ref 8.5–10.1)
CHLORIDE SERPL-SCNC: 101 MMOL/L (ref 94–109)
CO2 SERPL-SCNC: 32 MMOL/L (ref 20–32)
CREAT SERPL-MCNC: 0.79 MG/DL (ref 0.66–1.25)
CREAT UR-MCNC: 222 MG/DL
GFR SERPL CREATININE-BSD FRML MDRD: >90 ML/MIN/{1.73_M2}
GLUCOSE SERPL-MCNC: 158 MG/DL (ref 70–99)
HBA1C MFR BLD: 7.5 % (ref 0–5.6)
POTASSIUM SERPL-SCNC: 4 MMOL/L (ref 3.4–5.3)
PROT UR-MCNC: 0.1 G/L
PROT/CREAT 24H UR: 0.04 G/G CR (ref 0–0.2)
PSA SERPL-ACNC: 0.77 UG/L (ref 0–4)
SODIUM SERPL-SCNC: 138 MMOL/L (ref 133–144)

## 2021-01-26 PROCEDURE — 83036 HEMOGLOBIN GLYCOSYLATED A1C: CPT | Performed by: OBSTETRICS & GYNECOLOGY

## 2021-01-26 PROCEDURE — 84156 ASSAY OF PROTEIN URINE: CPT | Performed by: OBSTETRICS & GYNECOLOGY

## 2021-01-26 PROCEDURE — 80048 BASIC METABOLIC PNL TOTAL CA: CPT | Performed by: OBSTETRICS & GYNECOLOGY

## 2021-01-26 PROCEDURE — 36415 COLL VENOUS BLD VENIPUNCTURE: CPT | Performed by: OBSTETRICS & GYNECOLOGY

## 2021-01-26 PROCEDURE — 99396 PREV VISIT EST AGE 40-64: CPT | Performed by: OBSTETRICS & GYNECOLOGY

## 2021-01-26 PROCEDURE — G0103 PSA SCREENING: HCPCS | Performed by: OBSTETRICS & GYNECOLOGY

## 2021-01-26 ASSESSMENT — ENCOUNTER SYMPTOMS
COUGH: 0
NERVOUS/ANXIOUS: 0
DYSURIA: 0
SORE THROAT: 0
PARESTHESIAS: 0
DIZZINESS: 0
FREQUENCY: 0
CHILLS: 0
HEMATOCHEZIA: 0
DIARRHEA: 1
SHORTNESS OF BREATH: 0
WEAKNESS: 0
HEMATURIA: 0
ARTHRALGIAS: 0
MYALGIAS: 1
EYE PAIN: 0
PALPITATIONS: 0
HEARTBURN: 0
NAUSEA: 0
FEVER: 0
CONSTIPATION: 0
HEADACHES: 0
ABDOMINAL PAIN: 0
JOINT SWELLING: 0

## 2021-01-26 ASSESSMENT — MIFFLIN-ST. JEOR: SCORE: 1834.31

## 2021-01-26 ASSESSMENT — PAIN SCALES - GENERAL: PAINLEVEL: NO PAIN (0)

## 2021-01-26 NOTE — TELEPHONE ENCOUNTER
LM for patient to call [phone]. Please relay provider notes below when patient returns call.  Mary Contreras, CMA

## 2021-01-26 NOTE — RESULT ENCOUNTER NOTE
Mary/team,Please inform Ray/ or caretaker  that this result(s) is/are showing that his diabetes control is a little worse.  The hemoglobin A1c is now 7.5.  A year ago it was 6.7.  I would encourage him to make sure he takes all 3 Metformin pills daily.  Keep checking his blood sugars.  The prostate blood test was normal.  Thanks. ELADIA Haq MD

## 2021-01-26 NOTE — TELEPHONE ENCOUNTER
----- Message from Berto Haq MD sent at 1/26/2021  1:07 PM CST -----  Mary/team,Please inform Ray/ or caretaker  that this result(s) is/are showing that his diabetes control is a little worse.  The hemoglobin A1c is now 7.5.  A year ago it was 6.7.  I would encourage him to make sure he takes all 3 Metformin pills daily.  Keep checking his blood sugars.  The prostate blood test was normal.  Thanks. ELADIA Haq MD

## 2021-01-26 NOTE — PROGRESS NOTES
SUBJECTIVE:   CC: Ray Galdamez is an 55 year old male who presents for preventative health visit.       Patient has been advised of split billing requirements and indicates understanding: Yes  Healthy Habits:     Getting at least 3 servings of Calcium per day:  Yes    Bi-annual eye exam:  Yes    Dental care twice a year:  NO    Sleep apnea or symptoms of sleep apnea:  None    Diet:  Diabetic    Frequency of exercise:  1 day/week    Duration of exercise:  15-30 minutes    Taking medications regularly:  Yes    Medication side effects:  Other    PHQ-2 Total Score: 1    Additional concerns today:  Yes        Today's PHQ-2 Score:   PHQ-2 ( 1999 [employer]) 1/26/2021   Q1: Little interest or pleasure in doing things 1   Q2: Feeling down, depressed or hopeless 0   PHQ-2 Score 1   Q1: Little interest or pleasure in doing things Several days   Q2: Feeling down, depressed or hopeless Not at all   PHQ-2 Score 1       Abuse: Current or Past(Physical, Sexual or Emotional)- No  Do you feel safe in your environment? Yes    Subjective: Here for diabetic followup exam.   Has been monitoring sugars 1 times daily. Glucose values have been in the  150 range fasting, and ? range postprandial.  There have been no   changes in vision  No changes/ complaints   of neuropathy symptoms.   No other concerns        The past medical history, social history, past surgical history and family history as shown below have been reviewed by me today.  History reviewed. No pertinent past medical history.   No Known Allergies  Current Outpatient Medications   Medication Sig Dispense Refill     blood glucose (NO BRAND SPECIFIED) lancets standard Use to test blood sugar 4 times daily or as directed. 120 each 0     blood glucose (NO BRAND SPECIFIED) test strip Use to test blood sugar 4 times daily or as directed. 1 Box 0     blood glucose monitoring (NO BRAND SPECIFIED) meter device kit Use to test blood sugar 4 times daily or as directed. 1 kit 0      metFORMIN (GLUCOPHAGE) 500 MG tablet TAKE 2 TABLETS BY MOUTH EVERY MORNING AND 1 TABLET EVERY EVENING 42 tablet 0     Past Surgical History:   Procedure Laterality Date     COLONOSCOPY N/A 3/30/2018    Procedure: COMBINED COLONOSCOPY, SINGLE OR MULTIPLE BIOPSY/POLYPECTOMY BY BIOPSY;  Colonoscopy, Polypectomy by Biopsy;  Surgeon: Hesham Greer MD;  Location:  GI     Social History     Socioeconomic History     Marital status:      Spouse name: None     Number of children: None     Years of education: None     Highest education level: None   Occupational History     None   Social Needs     Financial resource strain: None     Food insecurity     Worry: None     Inability: None     Transportation needs     Medical: None     Non-medical: None   Tobacco Use     Smoking status: Former Smoker     Smokeless tobacco: Never Used   Substance and Sexual Activity     Alcohol use: Yes     Comment: rare     Drug use: Never     Sexual activity: Yes     Partners: Female   Lifestyle     Physical activity     Days per week: None     Minutes per session: None     Stress: None   Relationships     Social connections     Talks on phone: None     Gets together: None     Attends Anglican service: None     Active member of club or organization: None     Attends meetings of clubs or organizations: None     Relationship status: None     Intimate partner violence     Fear of current or ex partner: None     Emotionally abused: None     Physically abused: None     Forced sexual activity: None   Other Topics Concern     Parent/sibling w/ CABG, MI or angioplasty before 65F 55M? Not Asked   Social History Narrative     None     Family History   Problem Relation Age of Onset     Diabetes Mother      Diabetes Father      Myocardial Infarction Father      Diabetes Sister      Diabetes Son        ROS: A 12 point review of systems was done. Except for what is listed above in the HPI, the systems review is negative .      Objective: Vital  "signs: Blood pressure 124/81, pulse 78, temperature 97.4  F (36.3  C), temperature source Temporal, resp. rate 14, height 1.812 m (5' 11.35\"), weight 96.2 kg (212 lb), SpO2 96 %.      HEENT:  Sclerae and conjunctiva are normal.   Ear canals and TMs look normal.  Nasal mucosa is pink  - no polyps or masses seen.  sinuses are non tender to palpation.  Throat is unremarkable . Mucous membranes are moist.   Fundi are benign- disc margins are sharp. No papilledema noted.    Neck is supple, mobile, no adenoapthy or masses palpable. Normal range of motion noted.  Chest is clear to auscultation. No wheezes, rales or rhonchi heard.  cardiac exam is normal with s1, s2, no murmurs or adventitious sounds.Normal rate and rhythm is heard.  Exam of the feet is negative for paresthesias.  Has normal sensation and color to both feet, and normal pulses and no trophic skin changes or ulcers.       Assessment/Plan: 1. Type 2   Diabetes is  Moderately   well-controlled.    . Ace inhibitor therapy:    Discussed- he wants to hold off for now- I advised lisinopril- 2.5 mg daily- he will call if changes mind.    Labs today: A1C      Urine for microprotein    Lipids       Baby aspirin 65 or 81 mg advised daily as prophylaxis- watch for GI upset or tinnitus or bruising/signs of bleeding- stop if these occur.    Advised to recheck in  3 months.    Continue daily glucose monitoring. Recheck acutely if concerns.   Advised to have yearly ophthalmology exam, regular dental visits and keep up to date on flu vaccine and TDAP.       2. General physical exam- well adult exam-we discussed health screening exam including testicular exam and rectal exam and prostate exam.  He declined for now.  He declined colonoscopy.  Declined a flu shot.  I did suggest he consider a Covid vaccine when it is available.  I also advised him to take a baby aspirin daily and he has decided to do that.  He is still unwilling to take lisinopril for renal prevention related " to diabetes.  He had a complete DOT physical several months ago and told me that he is comfortable with what we have done today and does not want anything further.    4.  Hyperlipidemia based on lipid profile done 1 year ago which showed triglycerides of 411.  We will check lipids again today.      5.    41 minutes spent today in exam with patient and refilling his medications and charting afterwards.      ELADIA Haq MD              Answers for HPI/ROS submitted by the patient on 1/26/2021   Annual Exam:  abdominal pain: No  Blood in stool: No  Blood in urine: No  chest pain: No  chills: No  congestion: No  constipation: No  cough: No  diarrhea: Yes  dizziness: No  ear pain: No  eye pain: No  nervous/anxious: No  fever: No  frequency: No  genital sores: No  headaches: No  hearing loss: No  heartburn: No  arthralgias: No  joint swelling: No  peripheral edema: No  mood changes: No  myalgias: Yes  nausea: No  dysuria: No  palpitations: No  Skin sensation changes: No  sore throat: No  urgency: No  rash: No  shortness of breath: No  visual disturbance: No  weakness: No  impotence: No  penile discharge: No

## 2021-01-29 ENCOUNTER — OFFICE VISIT (OUTPATIENT)
Dept: FAMILY MEDICINE | Facility: CLINIC | Age: 56
End: 2021-01-29
Payer: COMMERCIAL

## 2021-01-29 ENCOUNTER — NURSE TRIAGE (OUTPATIENT)
Dept: FAMILY MEDICINE | Facility: CLINIC | Age: 56
End: 2021-01-29

## 2021-01-29 VITALS
OXYGEN SATURATION: 98 % | DIASTOLIC BLOOD PRESSURE: 83 MMHG | BODY MASS INDEX: 29.62 KG/M2 | TEMPERATURE: 98.6 F | RESPIRATION RATE: 14 BRPM | WEIGHT: 214.5 LBS | HEART RATE: 88 BPM | SYSTOLIC BLOOD PRESSURE: 131 MMHG

## 2021-01-29 DIAGNOSIS — M26.609 TEMPOROMANDIBULAR JOINT DISORDER: ICD-10-CM

## 2021-01-29 DIAGNOSIS — S16.1XXA STRAIN OF NECK MUSCLE, INITIAL ENCOUNTER: Primary | ICD-10-CM

## 2021-01-29 DIAGNOSIS — K04.7 DENTAL INFECTION: ICD-10-CM

## 2021-01-29 PROCEDURE — 99214 OFFICE O/P EST MOD 30 MIN: CPT | Performed by: PHYSICIAN ASSISTANT

## 2021-01-29 RX ORDER — CYCLOBENZAPRINE HCL 5 MG
5 TABLET ORAL 3 TIMES DAILY PRN
Qty: 20 TABLET | Refills: 0 | Status: SHIPPED | OUTPATIENT
Start: 2021-01-29 | End: 2022-07-15

## 2021-01-29 NOTE — PATIENT INSTRUCTIONS
Ibuprofen twice daily for pain and swelling  Tylenol as needed for pain  Ice and heat  Cyclobenzaprine for neck pain- may take up to three times a day   Augmentin twice daily for 7 days for infection  Follow up with dentist for xrays if symptoms are persistent. If symptoms are going away, cancel dentist appointment and reschedule. Take ibuprofen before next appointment to reduce inflammation and help reduce strain on TMJ joint.    Patient Education     When You Have Temporomandibular Disorder (TMD)  The temporomandibular joint (TMJ) is a ball-and-socket joint located where the upper and lower jaws meet. The TMJ and its nearby jaw muscles make up a complex, loosely connected system. Because of this, a problem in one part of the system can affect the other parts. This can cause you to have temporomandibular disorder (TMD).     How the temporomandibular joint works  You have 1 joint on each side of your mouth that together make up the TMJ. These joints are part of a large group of muscles, ligaments, and bones that work together as a system. When the system is healthy, you can talk, chew, and even yawn in comfort. Muscles contract and relax to open and close the joint. The disk is made of cartilage and is located between the condyle and the skull. It absorbs pressure in the joint and allows the jaws to open and close smoothly. Fluid (called synovial fluid) lubricates the joints. Ligaments connect the lower jaw bones to the skull. They also support the joint.   Common temporomandibular problems  When there is a problem with the TMJ and its related system, you can develop TMD. Common TMD problems include tight muscles, inflamed joints, and damaged joints.   In some cases, symptoms may be related to the teeth or bite.  Tight muscles  The muscles surrounding the TMJ can tighten (spasm) and cause pain.    Referred pain. This happens in a part of the body separate from the source of the problem. For example, pain in the face  or teeth could be coming from a problem in the TMJ.    Myofascial pain. This happens in soft tissues such as muscle. Trigger points in these pain areas often cause referred pain. You may feel jaw, neck, or shoulder pain.  Inflamed joints  Inflammation may include pain, redness, heat, swelling, or loss of function.    Synovitis. This happens when certain tissues around the TMJ become inflamed. It causes pain that increases with jaw movement.    Inflamed ligaments. This  can be caused by strain or injury. When this happens, the ligaments are unable to support the joint.    Rheumatoid arthritis. This is an autoimmune joint disease. It leads to inflammation and pain in the TMJ.  Damaged joints  Many people hear clicking when their jaw moves. If you feel pain along with the noise, the joint may be damaged.     Impingement. This happens when the disk slips out of place (displacement). This causes the jaw to catch. As the disk slips, you may hear a clicking sound.    Locked jaw. This happens when the disk gets stuck in one position. As a result, the jaw locks open or closed.    Osteoarthritis. This is a joint disease. It causes the TMJ to wear away (degenerate). This leads to pain during movement.  Other problems  The parts of the jaw and mouth make up a single unit. That s why a problem in 1 area can cause symptoms elsewhere. Teeth or bite problems linked to TMD include:     Grinding your teeth side to side (bruxism)    Biting down on your teeth (clenching)    When the teeth or bite is out of alignment (malocclusion)  Your healthcare provider will give you more information about these problems if needed.  [email or web address] last reviewed this educational content on 6/1/2020 2000-2020 [employer]. [address] [place] [address], [place] [address]. All rights reserved. This information is not intended as a substitute for professional medical care. Always follow your healthcare professional's instructions.

## 2021-01-29 NOTE — PROGRESS NOTES
Assessment & Plan     Strain of neck muscle, initial encounter  - cyclobenzaprine (FLEXERIL) 5 MG tablet; Take 1 tablet (5 mg) by mouth 3 times daily as needed for muscle spasms May take 2 tablets (10mg) as needed    Dental infection  - amoxicillin-clavulanate (AUGMENTIN) 875-125 MG tablet; Take 1 tablet by mouth 2 times daily for 7 days    Temporomandibular joint disorder  Discussed conservative treatment of TMJ disorders.  Rest, ice and heat, ibuprofen, Tylenol, avoiding crunchy or sticky foods.  Follow-up with dentist for further evaluation.      35 minutes spent on the date of the encounter doing chart review, patient visit and documentation       Return in about 2 weeks (around 2/12/2021).    LEONA Mayes [facility]     Ray is a 55 year old who presents to clinic today for the following health issues     HPI       Musculoskeletal problem/pain  Onset/Duration: 1 week  Description  Location: side of neck - left and some left shoulder pain  Joint Swelling: YES- left side jaw  Redness: no  Pain: YES- without biting 6-7/10, with pressure 8-9/10  Warmth: left side of cheek feels warm  Intensity:  mild, moderate  Progression of Symptoms:  worsening  Accompanying signs and symptoms:   Fevers: no  Numbness/tingling/weakness: no  History  Trauma to the area: no  Recent illness:  no  Previous similar problem: no  Previous evaluation:  no  Precipitating or alleviating factors:  Aggravating factors include: neck pain is constant  Therapies tried and outcome: rest/inactivity, heat, ice, massage, stretching, acetaminophen, Ibuprofen and chiropractor all of these have helped    Ray presents today with pain in his legs left lower tooth as well as neck tightness and pain in his left jaw.  1 week ago he went to the dentist because he lost a filling.  He had minor pain in this area but nothing significant.  At this time the dentist tapped on all of his teeth and none of  "them are painful.  After leaving the dentist and having his mouth open wide for an extended period of time he noted some pain over his left TMJ.  Over the last week this pain has worsened.  He feels tenderness in the masseter muscle, tightness and tenderness into his neck and shoulder and slightly behind his left ear.  He saw a chiropractor which did help to relieve some of the muscle tightness.  He also has pain when biting down and feels that his bite is not aligned the same way it was before going to the dentist.  He does not have a fever and does not feel \"sick\" but states in general he just does not feel well.  He now also has pain in one specific tooth when touching it, not the tooth that was worked on, for the last couple days.    Review of Systems   ROS negative except as noted above      Objective    /83 (BP Location: Left arm)   Pulse 88   Temp 98.6  F (37  C) (Temporal)   Resp 14   Wt 97.3 kg (214 lb 8 oz)   SpO2 98%   BMI 29.62 kg/m    Body mass index is 29.62 kg/m .  Physical Exam   GENERAL: healthy, alert and no distress  EYES: Eyes grossly normal to inspection, PERRL and conjunctivae and sclerae normal  HENT: ear canals and TM's normal, nose and mouth without ulcers or lesions. Tenderness to palpation over masseter muscle on the left, muscle is tight to palpation.  No clicking or popping is palpable with full extension of jaw.  Tenderness of tooth 18 on bottom left but no visible erythema, swelling, malformation of the tooth.  He does have extensive dental work in his mouth.  NECK: no adenopathy, no asymmetry, masses, or scars and thyroid normal to palpation  RESP: lungs clear to auscultation - no rales, rhonchi or wheezes  CV: regular rate and rhythm, normal S1 S2, no S3 or S4, no murmur, click or rub, no peripheral edema and peripheral pulses strong  SKIN: no suspicious lesions or rashes  NEURO: Normal strength and tone, mentation intact and speech normal  MSK: Full range of motion in " neck.  Tenderness and muscle stiffness noted to palpation over the left trapezius muscle.    I spent a total of 25 minutes face-to-face with Ray Galdamez during today's office visit.  Over 50% of this time was spent counseling the patient and/or coordinating care regarding TMJ, muscle strain neck, dental infection.  See note for details.

## 2021-01-29 NOTE — TELEPHONE ENCOUNTER
Chills and body aches started yesterday no fever.   Digital thermometer was 98.2  Neck is very stiff but is able to touch chest with chin  Tooth hurts when chews.   Left side Joint of jaw hurts.   Wife and patient states no swelling.   Seen dentist a week ago had 2 fillings, neck hurt so went to chiropractor.     DENIES: SOB, dyspnea, chest pain, nausea/vomiting, diarrhea/constipation, urination/bowel issues, fever.       scheduled to be seen today with Jungers.       Additional Information    Negative: Shock suspected (e.g., cold/pale/clammy skin, too weak to stand, low BP, rapid pulse)    Negative: Similar pain previously and it was from 'heart attack'    Negative: Similar pain previously from 'angina' and not relieved by nitroglycerin    Negative: Difficult to awaken or acting confused (e.g., disoriented, slurred speech)    Negative: Sounds like a life-threatening emergency to the triager    Negative: Followed an injury to neck (e.g., MVA, sports, impact or collision)    Negative: Chest pain    Negative: Lymph node in the neck is swollen or painful to the touch    Negative: Sore throat is the main symptom    Negative: Difficulty breathing or unusual sweating (e.g., sweating without exertion)     Chills then covers up and then sweats    Negative: Chest pain lasting longer than 5 minutes    Negative: Stiff neck (can't touch chin to chest) and has headache    Negative: Stiff neck (can't touch chin to chest) and fever    Negative: Weakness of an arm or hand    Negative: Problems with bowel or bladder control    Negative: Patient sounds very sick or weak to the triager    Negative: Fever > 103 F (39.4 C)    Negative: Fever > 100.0 F (37.8 C) and IVDA (intravenous drug abuse)    Negative: Fever > 100.0 F (37.8 C) and has diabetes mellitus or a weak immune system (e.g., HIV positive, cancer chemotherapy, organ transplant, splenectomy, chronic steroids)    Negative: Head is twisting to one side (or ask 'is it turning  "against your will?')    Negative: SEVERE pain (e.g., excruciating, unable to do any normal activities)    Negative: Numbness in an arm or hand (i.e., loss of sensation)    Negative: Painful rash with multiple small blisters grouped together (i.e., dermatomal distribution or 'band' or 'stripe')    Negative: High-risk adult (e.g., history of cancer, HIV, or IV drug abuse)    Negative: Tenderness in front of neck over windpipe    Patient wants to be seen    Answer Assessment - Initial Assessment Questions  1. ONSET: \"When did the pain begin?\"       Last week  2. LOCATION: \"Where does it hurt?\"       Left side of jaw, able to touch chin to chest but is stiff, left side in the back is a knot there goes into jaw and head. Was getting better with cold pack but not working anymore  3. PATTERN \"Does the pain come and go, or has it been constant since it started?\"   Constant pain unless holds pain just perfect will stop for awhile but comes        4. SEVERITY: \"How bad is the pain?\"  (Scale 1-10; or mild, moderate, severe)    - MILD (1-3): doesn't interfere with normal activities     - MODERATE (4-7): interferes with normal activities or awakens from sleep     - SEVERE (8-10):  excruciating pain, unable to do any normal activities       7 or 8 /10 unless puts pressure on tooth hurts more if chews on that left side pain increase  5. RADIATION: \"Does the pain go anywhere else, shoot into your arms?\"      From knot in neck goes up into left eye and into head feels stiff on the whole left   6. CORD SYMPTOMS: \"Any weakness or numbness of the arms or legs?\"      None, states feels a little bit weaker today but can still move around  7. CAUSE: \"What do you think is causing the neck pain?\"      Neck pain started before the tooth pain, chiropractor worked on neck and jaw did help for a while  8. NECK OVERUSE: \"Any recent activities that involved turning or twisting the neck?\"      Nothing out of ordinary  9. OTHER SYMPTOMS: \"Do you " "have any other symptoms?\" (e.g., headache, fever, chest pain, difficulty breathing, neck swelling)      No headache, pressure on back of head from neck  10. PREGNANCY: \"Is there any chance you are pregnant?\" \"When was your last menstrual period?\"        n/a    Protocols used: NECK PAIN OR [id number]      "

## 2021-02-01 NOTE — TELEPHONE ENCOUNTER
The note says he was steele[pposed to be seen on Friday- was he seen? If not, ask him if his tooth still hurts and if so we can Rx VK penicillin 500 mg TID # 30 as long as he has no allergy to peniciilin or cephalosporin- and please make sure he sees a dentist ASAP- if his sx worsen please make sure he gets an appointment in the clinic- thanks- rm     Patient was seen on Friday by Samantha Lyon and received antibiotic at that time.  Mary Contreras, CMA

## 2021-04-07 ENCOUNTER — IMMUNIZATION (OUTPATIENT)
Dept: FAMILY MEDICINE | Facility: CLINIC | Age: 56
End: 2021-04-07
Payer: COMMERCIAL

## 2021-04-07 PROCEDURE — 0011A PR COVID VAC MODERNA 100 MCG/0.5 ML IM: CPT

## 2021-04-07 PROCEDURE — 91301 PR COVID VAC MODERNA 100 MCG/0.5 ML IM: CPT

## 2021-04-10 ENCOUNTER — HEALTH MAINTENANCE LETTER (OUTPATIENT)
Age: 56
End: 2021-04-10

## 2021-05-05 ENCOUNTER — IMMUNIZATION (OUTPATIENT)
Dept: FAMILY MEDICINE | Facility: CLINIC | Age: 56
End: 2021-05-05
Attending: FAMILY MEDICINE
Payer: COMMERCIAL

## 2021-05-05 PROCEDURE — 91301 PR COVID VAC MODERNA 100 MCG/0.5 ML IM: CPT

## 2021-05-05 PROCEDURE — 0012A PR COVID VAC MODERNA 100 MCG/0.5 ML IM: CPT

## 2021-07-03 ENCOUNTER — TRANSFERRED RECORDS (OUTPATIENT)
Dept: HEALTH INFORMATION MANAGEMENT | Facility: CLINIC | Age: 56
End: 2021-07-03

## 2021-07-03 LAB — RETINOPATHY: NEGATIVE

## 2021-09-19 ENCOUNTER — HEALTH MAINTENANCE LETTER (OUTPATIENT)
Age: 56
End: 2021-09-19

## 2022-01-26 ENCOUNTER — TELEPHONE (OUTPATIENT)
Dept: FAMILY MEDICINE | Facility: CLINIC | Age: 57
End: 2022-01-26
Payer: COMMERCIAL

## 2022-01-26 DIAGNOSIS — E11.9 TYPE 2 DIABETES MELLITUS WITHOUT COMPLICATION, WITHOUT LONG-TERM CURRENT USE OF INSULIN (H): Primary | ICD-10-CM

## 2022-01-26 NOTE — TELEPHONE ENCOUNTER
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  He needs an A1C done.  Can you please order this for him to complete.  Patient has scheduled a well physical with you in February.    Aranza WAITEO/

## 2022-01-26 NOTE — TELEPHONE ENCOUNTER
Patient returning call. Informed that Dr. Haq did place lab orders. Offered to assist with scheduling of a lab appointment and patient stated they already scheduled the lab for Monday, 1/31/22. Nia Abbott LPN

## 2022-01-31 ENCOUNTER — LAB (OUTPATIENT)
Dept: LAB | Facility: CLINIC | Age: 57
End: 2022-01-31
Payer: COMMERCIAL

## 2022-01-31 DIAGNOSIS — E11.9 TYPE 2 DIABETES MELLITUS WITHOUT COMPLICATION, WITHOUT LONG-TERM CURRENT USE OF INSULIN (H): ICD-10-CM

## 2022-01-31 LAB
ANION GAP SERPL CALCULATED.3IONS-SCNC: 4 MMOL/L (ref 3–14)
BUN SERPL-MCNC: 12 MG/DL (ref 7–30)
CALCIUM SERPL-MCNC: 9.2 MG/DL (ref 8.5–10.1)
CHLORIDE BLD-SCNC: 105 MMOL/L (ref 94–109)
CHOLEST SERPL-MCNC: 171 MG/DL
CO2 SERPL-SCNC: 31 MMOL/L (ref 20–32)
CREAT SERPL-MCNC: 0.81 MG/DL (ref 0.66–1.25)
FASTING STATUS PATIENT QL REPORTED: YES
GFR SERPL CREATININE-BSD FRML MDRD: >90 ML/MIN/1.73M2
GLUCOSE BLD-MCNC: 164 MG/DL (ref 70–99)
HBA1C MFR BLD: 9 % (ref 0–5.6)
HDLC SERPL-MCNC: 35 MG/DL
LDLC SERPL CALC-MCNC: 59 MG/DL
NONHDLC SERPL-MCNC: 136 MG/DL
POTASSIUM BLD-SCNC: 4 MMOL/L (ref 3.4–5.3)
SODIUM SERPL-SCNC: 140 MMOL/L (ref 133–144)
TRIGL SERPL-MCNC: 385 MG/DL

## 2022-01-31 PROCEDURE — 83036 HEMOGLOBIN GLYCOSYLATED A1C: CPT

## 2022-01-31 PROCEDURE — 80061 LIPID PANEL: CPT

## 2022-01-31 PROCEDURE — 80048 BASIC METABOLIC PNL TOTAL CA: CPT

## 2022-01-31 PROCEDURE — 36415 COLL VENOUS BLD VENIPUNCTURE: CPT

## 2022-02-01 DIAGNOSIS — E11.9 TYPE 2 DIABETES MELLITUS WITHOUT COMPLICATION, WITHOUT LONG-TERM CURRENT USE OF INSULIN (H): ICD-10-CM

## 2022-03-06 DIAGNOSIS — E11.9 TYPE 2 DIABETES MELLITUS WITHOUT COMPLICATION, WITHOUT LONG-TERM CURRENT USE OF INSULIN (H): ICD-10-CM

## 2022-03-07 NOTE — TELEPHONE ENCOUNTER
Metformin  Prescription approved per [facility] Refill Protocol.    Nichole Rowell RN

## 2022-03-28 ASSESSMENT — ENCOUNTER SYMPTOMS
DIZZINESS: 0
SORE THROAT: 0
PARESTHESIAS: 0
NERVOUS/ANXIOUS: 0
PALPITATIONS: 0
HEARTBURN: 0
NAUSEA: 0
MYALGIAS: 0
DYSURIA: 0
ARTHRALGIAS: 0
EYE PAIN: 0
CONSTIPATION: 0
ABDOMINAL PAIN: 0
WEAKNESS: 0
SHORTNESS OF BREATH: 0
CHILLS: 0
FREQUENCY: 0
HEADACHES: 0
COUGH: 0
FEVER: 0
HEMATOCHEZIA: 0
HEMATURIA: 0
JOINT SWELLING: 0
DIARRHEA: 0

## 2022-03-29 ENCOUNTER — OFFICE VISIT (OUTPATIENT)
Dept: FAMILY MEDICINE | Facility: CLINIC | Age: 57
End: 2022-03-29
Payer: COMMERCIAL

## 2022-03-29 VITALS
SYSTOLIC BLOOD PRESSURE: 136 MMHG | OXYGEN SATURATION: 99 % | TEMPERATURE: 98.4 F | DIASTOLIC BLOOD PRESSURE: 82 MMHG | RESPIRATION RATE: 16 BRPM | HEART RATE: 80 BPM | WEIGHT: 211 LBS | BODY MASS INDEX: 29.54 KG/M2 | HEIGHT: 71 IN

## 2022-03-29 DIAGNOSIS — E11.9 TYPE 2 DIABETES MELLITUS WITHOUT COMPLICATION, WITHOUT LONG-TERM CURRENT USE OF INSULIN (H): Primary | ICD-10-CM

## 2022-03-29 DIAGNOSIS — Z00.00 WELL ADULT EXAM: ICD-10-CM

## 2022-03-29 PROCEDURE — 99213 OFFICE O/P EST LOW 20 MIN: CPT | Mod: 25 | Performed by: OBSTETRICS & GYNECOLOGY

## 2022-03-29 PROCEDURE — 99396 PREV VISIT EST AGE 40-64: CPT | Performed by: OBSTETRICS & GYNECOLOGY

## 2022-03-29 RX ORDER — LISINOPRIL 2.5 MG/1
2.5 TABLET ORAL DAILY
Qty: 90 TABLET | Refills: 3 | Status: SHIPPED | OUTPATIENT
Start: 2022-03-29 | End: 2022-07-15

## 2022-03-29 ASSESSMENT — PAIN SCALES - GENERAL: PAINLEVEL: NO PAIN (0)

## 2022-03-29 NOTE — PROGRESS NOTES
Subjective:Ray is here for yearly physical. Current concerns are:   He has type 2 diabetes and takes Metformin.  He needs a refill.  He checks his blood sugars each morning.  They range from 1 30-1 80.  He needs to schedule his ophthalmology appointment again.  Did it last year.  Needs a refill on his test strips as well.  Denies any current cocnerns.  Denies any paresthesias.    History reviewed. No pertinent past medical history.   Current Outpatient Medications   Medication Sig Dispense Refill     blood glucose (NO BRAND SPECIFIED) lancets standard Use to test blood sugar 4 times daily or as directed. 120 each 0     blood glucose (NO BRAND SPECIFIED) test strip Use to test blood sugar 4 times daily or as directed. 1 Box 0     blood glucose monitoring (NO BRAND SPECIFIED) meter device kit Use to test blood sugar 4 times daily or as directed. 1 kit 0     cyclobenzaprine (FLEXERIL) 5 MG tablet Take 1 tablet (5 mg) by mouth 3 times daily as needed for muscle spasms May take 2 tablets (10mg) as needed 20 tablet 0     metFORMIN (GLUCOPHAGE) 500 MG tablet TAKE 2 TABLETS BY MOUTH EVERY MORNING AND 1 TABLET IN THE EVENING 90 tablet 0      No Known Allergies   History   Smoking Status     Former Smoker   Smokeless Tobacco     Never Used      Past Surgical History:   Procedure Laterality Date     COLONOSCOPY N/A 3/30/2018    Procedure: COMBINED COLONOSCOPY, SINGLE OR MULTIPLE BIOPSY/POLYPECTOMY BY BIOPSY;  Colonoscopy, Polypectomy by Biopsy;  Surgeon: Hesham Greer MD;  Location:  GI      Social History     Tobacco Use     Smoking status: Former Smoker     Smokeless tobacco: Never Used   Substance Use Topics     Alcohol use: Yes     Comment: rare     Drug use: Never      Family History   Problem Relation Age of Onset     Diabetes Mother      Diabetes Father      Myocardial Infarction Father      Diabetes Sister      Diabetes Son        Review Of Systems  Skin: negative  Eyes: negative  Ears/Nose/Throat:  "negative  Respiratory: No shortness of breath, dyspnea on exertion, cough, or hemoptysis  Cardiovascular: negative  Gastrointestinal: negative  Genitourinary: negative  Musculoskeletal: negative  Neurologic: negative  Psychiatric: negative  Hematologic/Lymphatic/Immunologic: negative  Endocrine: negative      Physical Exam: vital signs:   Blood pressure 136/82, pulse 80, temperature 98.4  F (36.9  C), temperature source Temporal, resp. rate 16, height 1.812 m (5' 11.35\"), weight 95.7 kg (211 lb), SpO2 99 %.        HEENT is normal,   Sclerae and conjunctiva are normal. Ear canals and TMs look normal.  Nasal mucosa is pink and no polyps or masses seen. Throat is unremarkable . Mucous membranes are moist.    Neck is supple, mobile, no adenoapthy or masses palpable. Normal range of motion noted.  Chest is clear to auscultation. No wheezes, rales or rhonchi heard. cardiac exam is normal with s1, s2, no murmurs or adventitious sounds.Normal rate and rhythm is heard.  Abdomen is soft,  nondistended, No masses felt.No HSM. No guarding or rigidity or rebound noted. Nontender   to palpation. Normal bowel sounds heard.   Extremities have normal sensation and color.Normal pulses noted.  No cyanosis or ulcers or trophic skin changes. No edema noted.  He has normal capillary refill in his fingers and toes.    Genital Exam: declined    Assessment/Plan:1.Normal yearly exam  except for type 2 diabetes-his control has slipped recently.  A1c 6 weeks ago was 9.0.  This has increased from the 7 range over the past several years.  He takes Metformin.  He recently got back on the wagon about taking care of his health and eating better.  Hopefully this will help.  I did suggest he try to lose 20 pounds.  Have also advised him to start lisinopril 2.5 mg daily and prescription has been sent.  I will refill the Metformin now.  I suggested he check an A1c again in May which would be about 3 months from the last test.  Strongly advised him to " exercise.  Advised walking 5 miles a day.  He did get a treadmill.      2.  Prostate screening-he has had normal PSA checks twice over the past 4 years.  Also had a colonoscopy 4 years ago when he turned 52.  They checked his prostate then.  I offered a prostate exam today but I do not really think he needs one since his PSA has been so normal and he has no symptoms of decreased urinary stream etc.  We will consider doing this again next year.        Diet and rest and exercise discussed.   See labs and orders.Immunizations reviewed and discussed-including advice for yearly flu shot/tetanus update    Immunizations given today:     Colonoscopy again in 6 years    I advised the following exams with specialists:    1. Dental evaluation yearly    2. Dermatology evaluation for mole exam yearly    3. Ophthalmology exam yearly to check for glaucoma, etc              Labs done: Hemoglobin A1c will be done in May.      Followup in 3-4 months, sooner if concerns arise.   ELADIA Haq MD(electronic signature)    Answers for HPI/ROS submitted by the patient on 3/28/2022  Frequency of exercise:: 1 day/week  Getting at least 3 servings of Calcium per day:: Yes  Diet:: Diabetic  Taking medications regularly:: Yes  Medication side effects:: None  Bi-annual eye exam:: Yes  Dental care twice a year:: NO  Sleep apnea or symptoms of sleep apnea:: None  abdominal pain: No  Blood in stool: No  Blood in urine: No  chest pain: No  chills: No  congestion: No  constipation: No  cough: No  diarrhea: No  dizziness: No  ear pain: No  eye pain: No  nervous/anxious: No  fever: No  frequency: No  genital sores: No  headaches: No  hearing loss: No  heartburn: No  arthralgias: No  joint swelling: No  peripheral edema: No  mood changes: No  myalgias: No  nausea: No  dysuria: No  palpitations: No  Skin sensation changes: No  sore throat: No  urgency: No  rash: No  shortness of breath: No  visual disturbance: No  weakness: No  impotence: No  penile  discharge: No  Additional concerns today:: No  Duration of exercise:: Less than 15 minutes

## 2022-06-04 ENCOUNTER — TRANSFERRED RECORDS (OUTPATIENT)
Dept: HEALTH INFORMATION MANAGEMENT | Facility: CLINIC | Age: 57
End: 2022-06-04
Payer: COMMERCIAL

## 2022-06-04 LAB — RETINOPATHY: NEGATIVE

## 2022-07-15 ENCOUNTER — APPOINTMENT (OUTPATIENT)
Dept: CT IMAGING | Facility: CLINIC | Age: 57
End: 2022-07-15
Attending: EMERGENCY MEDICINE
Payer: COMMERCIAL

## 2022-07-15 ENCOUNTER — HOSPITAL ENCOUNTER (EMERGENCY)
Facility: CLINIC | Age: 57
Discharge: HOME | End: 2022-07-15
Attending: EMERGENCY MEDICINE | Admitting: EMERGENCY MEDICINE
Payer: COMMERCIAL

## 2022-07-15 VITALS
WEIGHT: 207.9 LBS | OXYGEN SATURATION: 98 % | SYSTOLIC BLOOD PRESSURE: 152 MMHG | RESPIRATION RATE: 18 BRPM | TEMPERATURE: 97.9 F | DIASTOLIC BLOOD PRESSURE: 78 MMHG | BODY MASS INDEX: 28.71 KG/M2 | HEART RATE: 90 BPM

## 2022-07-15 DIAGNOSIS — N13.2 HYDRONEPHROSIS WITH URINARY OBSTRUCTION DUE TO URETERAL CALCULUS: ICD-10-CM

## 2022-07-15 LAB
ALBUMIN SERPL-MCNC: 4 G/DL (ref 3.4–5)
ALBUMIN UR-MCNC: NEGATIVE MG/DL
ALP SERPL-CCNC: 83 U/L (ref 40–150)
ALT SERPL W P-5'-P-CCNC: 46 U/L (ref 0–70)
ANION GAP SERPL CALCULATED.3IONS-SCNC: 7 MMOL/L (ref 3–14)
APPEARANCE UR: CLEAR
AST SERPL W P-5'-P-CCNC: 22 U/L (ref 0–45)
BASOPHILS # BLD AUTO: 0 10E3/UL (ref 0–0.2)
BASOPHILS NFR BLD AUTO: 0 %
BILIRUB SERPL-MCNC: 0.6 MG/DL (ref 0.2–1.3)
BILIRUB UR QL STRIP: NEGATIVE
BUN SERPL-MCNC: 14 MG/DL (ref 7–30)
CALCIUM SERPL-MCNC: 9.2 MG/DL (ref 8.5–10.1)
CHLORIDE BLD-SCNC: 102 MMOL/L (ref 94–109)
CO2 SERPL-SCNC: 29 MMOL/L (ref 20–32)
COLOR UR AUTO: ABNORMAL
CREAT SERPL-MCNC: 1.08 MG/DL (ref 0.66–1.25)
EOSINOPHIL # BLD AUTO: 0 10E3/UL (ref 0–0.7)
EOSINOPHIL NFR BLD AUTO: 0 %
ERYTHROCYTE [DISTWIDTH] IN BLOOD BY AUTOMATED COUNT: 12.4 % (ref 10–15)
GFR SERPL CREATININE-BSD FRML MDRD: 82 ML/MIN/1.73M2
GLUCOSE BLD-MCNC: 271 MG/DL (ref 70–99)
GLUCOSE UR STRIP-MCNC: >=1000 MG/DL
HCT VFR BLD AUTO: 45.2 % (ref 40–53)
HGB BLD-MCNC: 16 G/DL (ref 13.3–17.7)
HGB UR QL STRIP: ABNORMAL
IMM GRANULOCYTES # BLD: 0 10E3/UL
IMM GRANULOCYTES NFR BLD: 0 %
KETONES UR STRIP-MCNC: ABNORMAL MG/DL
LEUKOCYTE ESTERASE UR QL STRIP: NEGATIVE
LYMPHOCYTES # BLD AUTO: 0.7 10E3/UL (ref 0.8–5.3)
LYMPHOCYTES NFR BLD AUTO: 5 %
MCH RBC QN AUTO: 29 PG (ref 26.5–33)
MCHC RBC AUTO-ENTMCNC: 35.4 G/DL (ref 31.5–36.5)
MCV RBC AUTO: 82 FL (ref 78–100)
MONOCYTES # BLD AUTO: 0.8 10E3/UL (ref 0–1.3)
MONOCYTES NFR BLD AUTO: 6 %
MUCOUS THREADS #/AREA URNS LPF: PRESENT /LPF
NEUTROPHILS # BLD AUTO: 13.2 10E3/UL (ref 1.6–8.3)
NEUTROPHILS NFR BLD AUTO: 89 %
NITRATE UR QL: NEGATIVE
NRBC # BLD AUTO: 0 10E3/UL
NRBC BLD AUTO-RTO: 0 /100
PH UR STRIP: 5.5 [PH] (ref 5–7)
PLATELET # BLD AUTO: 288 10E3/UL (ref 150–450)
POTASSIUM BLD-SCNC: 4.1 MMOL/L (ref 3.4–5.3)
PROT SERPL-MCNC: 7.7 G/DL (ref 6.8–8.8)
RBC # BLD AUTO: 5.51 10E6/UL (ref 4.4–5.9)
RBC URINE: >182 /HPF
SODIUM SERPL-SCNC: 138 MMOL/L (ref 133–144)
SP GR UR STRIP: 1.01 (ref 1–1.03)
SQUAMOUS EPITHELIAL: <1 /HPF
UROBILINOGEN UR STRIP-MCNC: NORMAL MG/DL
WBC # BLD AUTO: 14.8 10E3/UL (ref 4–11)
WBC URINE: 0 /HPF

## 2022-07-15 PROCEDURE — 36415 COLL VENOUS BLD VENIPUNCTURE: CPT | Performed by: EMERGENCY MEDICINE

## 2022-07-15 PROCEDURE — 96361 HYDRATE IV INFUSION ADD-ON: CPT | Performed by: EMERGENCY MEDICINE

## 2022-07-15 PROCEDURE — 258N000003 HC RX IP 258 OP 636: Performed by: EMERGENCY MEDICINE

## 2022-07-15 PROCEDURE — 250N000011 HC RX IP 250 OP 636: Performed by: EMERGENCY MEDICINE

## 2022-07-15 PROCEDURE — 85025 COMPLETE CBC W/AUTO DIFF WBC: CPT | Performed by: EMERGENCY MEDICINE

## 2022-07-15 PROCEDURE — 74177 CT ABD & PELVIS W/CONTRAST: CPT

## 2022-07-15 PROCEDURE — 250N000013 HC RX MED GY IP 250 OP 250 PS 637: Performed by: EMERGENCY MEDICINE

## 2022-07-15 PROCEDURE — 96375 TX/PRO/DX INJ NEW DRUG ADDON: CPT | Performed by: EMERGENCY MEDICINE

## 2022-07-15 PROCEDURE — 80053 COMPREHEN METABOLIC PANEL: CPT | Performed by: EMERGENCY MEDICINE

## 2022-07-15 PROCEDURE — 250N000009 HC RX 250: Performed by: EMERGENCY MEDICINE

## 2022-07-15 PROCEDURE — 96374 THER/PROPH/DIAG INJ IV PUSH: CPT | Mod: 59 | Performed by: EMERGENCY MEDICINE

## 2022-07-15 PROCEDURE — 99285 EMERGENCY DEPT VISIT HI MDM: CPT | Performed by: EMERGENCY MEDICINE

## 2022-07-15 PROCEDURE — 99285 EMERGENCY DEPT VISIT HI MDM: CPT | Mod: 25 | Performed by: EMERGENCY MEDICINE

## 2022-07-15 PROCEDURE — 81001 URINALYSIS AUTO W/SCOPE: CPT | Performed by: EMERGENCY MEDICINE

## 2022-07-15 RX ORDER — LORAZEPAM 0.5 MG/1
0.5 TABLET ORAL EVERY 6 HOURS PRN
Qty: 12 TABLET | Refills: 0 | Status: SHIPPED | OUTPATIENT
Start: 2022-07-15 | End: 2023-10-27

## 2022-07-15 RX ORDER — IOPAMIDOL 755 MG/ML
500 INJECTION, SOLUTION INTRAVASCULAR ONCE
Status: COMPLETED | OUTPATIENT
Start: 2022-07-15 | End: 2022-07-15

## 2022-07-15 RX ORDER — LORAZEPAM 2 MG/ML
1 INJECTION INTRAMUSCULAR ONCE
Status: COMPLETED | OUTPATIENT
Start: 2022-07-15 | End: 2022-07-15

## 2022-07-15 RX ORDER — TAMSULOSIN HYDROCHLORIDE 0.4 MG/1
0.4 CAPSULE ORAL DAILY
Qty: 7 CAPSULE | Refills: 0 | Status: SHIPPED | OUTPATIENT
Start: 2022-07-15 | End: 2023-10-27

## 2022-07-15 RX ORDER — SODIUM CHLORIDE 9 MG/ML
INJECTION, SOLUTION INTRAVENOUS CONTINUOUS
Status: DISCONTINUED | OUTPATIENT
Start: 2022-07-15 | End: 2022-07-15 | Stop reason: HOSPADM

## 2022-07-15 RX ORDER — ONDANSETRON 2 MG/ML
4 INJECTION INTRAMUSCULAR; INTRAVENOUS EVERY 30 MIN PRN
Status: DISCONTINUED | OUTPATIENT
Start: 2022-07-15 | End: 2022-07-15 | Stop reason: HOSPADM

## 2022-07-15 RX ORDER — OXYCODONE HYDROCHLORIDE 5 MG/1
5 TABLET ORAL ONCE
Status: COMPLETED | OUTPATIENT
Start: 2022-07-15 | End: 2022-07-15

## 2022-07-15 RX ORDER — KETOROLAC TROMETHAMINE 30 MG/ML
30 INJECTION, SOLUTION INTRAMUSCULAR; INTRAVENOUS ONCE
Status: COMPLETED | OUTPATIENT
Start: 2022-07-15 | End: 2022-07-15

## 2022-07-15 RX ORDER — OXYCODONE HYDROCHLORIDE 5 MG/1
5 TABLET ORAL EVERY 6 HOURS PRN
Qty: 12 TABLET | Refills: 0 | Status: SHIPPED | OUTPATIENT
Start: 2022-07-15 | End: 2022-07-18

## 2022-07-15 RX ORDER — HYDROMORPHONE HYDROCHLORIDE 1 MG/ML
0.5 INJECTION, SOLUTION INTRAMUSCULAR; INTRAVENOUS; SUBCUTANEOUS
Status: DISCONTINUED | OUTPATIENT
Start: 2022-07-15 | End: 2022-07-15 | Stop reason: HOSPADM

## 2022-07-15 RX ADMIN — HYDROMORPHONE HYDROCHLORIDE 0.5 MG: 1 INJECTION, SOLUTION INTRAMUSCULAR; INTRAVENOUS; SUBCUTANEOUS at 11:23

## 2022-07-15 RX ADMIN — SODIUM CHLORIDE 60 ML: 9 INJECTION, SOLUTION INTRAVENOUS at 11:39

## 2022-07-15 RX ADMIN — IOPAMIDOL 90 ML: 755 INJECTION, SOLUTION INTRAVENOUS at 11:39

## 2022-07-15 RX ADMIN — ONDANSETRON 4 MG: 2 INJECTION INTRAMUSCULAR; INTRAVENOUS at 11:23

## 2022-07-15 RX ADMIN — KETOROLAC TROMETHAMINE 30 MG: 30 INJECTION, SOLUTION INTRAMUSCULAR at 12:55

## 2022-07-15 RX ADMIN — LORAZEPAM 1 MG: 2 INJECTION INTRAMUSCULAR; INTRAVENOUS at 14:01

## 2022-07-15 RX ADMIN — OXYCODONE HYDROCHLORIDE 5 MG: 5 TABLET ORAL at 14:02

## 2022-07-15 RX ADMIN — SODIUM CHLORIDE 1000 ML: 9 INJECTION, SOLUTION INTRAVENOUS at 11:22

## 2022-07-15 NOTE — ED TRIAGE NOTES
Patient arrived to ED with complaints of lower abdominal pain since 0500. Patient states that he has tried an ENEMA without relief. Patient states he had a bowel movement yesterday but it was not as much as normal.

## 2022-07-15 NOTE — DISCHARGE INSTRUCTIONS
Take 1 tablet of the Ativan every 6 hours as needed for nausea, vomiting, or as an adjunct for pain.  It can make you drowsy so do not drive or drink alcohol if taking this medicine    You may take the oxycodone every 6 hours as needed for severe pain.  This medicine can make you drowsy so do not drive or drink alcohol if taking this.  Use caution when taking it with Ativan as they can both decrease your respiratory rate and make you very drowsy    If you have more mild or moderate pain you may take Tylenol or ibuprofen per bottle instructions    Take Flomax once daily till the stone is passed    Drink plenty of fluids    A referral to urology was made electronically today.  Dr. Sofia should be following up with you.  If you do not hear from him, you may contact his office to schedule a follow-up appointment.    Kidney stones under 5 mm tend to pass on their own, but sometimes it will need to be broken up to fully pass.  If you continue to have symptoms, Dr. Sofia can get you scheduled for this procedure    If you have any new or concerning symptoms develop, especially if you develop a fever, or vomiting and cannot keep anything down, have more severe pain that not controlled with the medication, or any new concerns, do not hesitate to return to the emergency room for evaluation

## 2022-07-15 NOTE — ED PROVIDER NOTES
History     Chief Complaint   Patient presents with     Abdominal Pain     Patient states he thinks he is constipated. Patient tried ENEMA without relief Last BM yesterday but was less than normal. Patient states he has also been nauseated and feels full.     HPI  Ray Galdamez is a 56 year old male who Zentz to the emergency room for concerns of abdominal pain.  Started at 5 AM and woke him from sleep.  He describes it as a full feeling, a lot of pressure in his abdomen but more pain down on the right lower side.  Pain also goes up his right side and in his back.  Felt like he needed to have a bowel movement.  Has been trying to have bowel movement with not much coming out.  He did try an enema and said it only helped a small amount of stool passed.  The stool was not hard, nor was it bloody or dark black.  Has been nauseated and did vomit, and says that he has been sweating but is not sure if it is due to the pain or being nervous.  Denies any urinary symptoms.  Did have p.o. intake around 730 or 8 AM today which stayed down.  No history of previous abdominal surgeries.  He was feeling well last night before he went to bed so this pain is new as of this morning.  He is also previously had a colonoscopy which he said was normal.    Allergies:  No Known Allergies    Problem List:    Patient Active Problem List    Diagnosis Date Noted     Hyperlipidemia LDL goal <100 10/30/2018     Priority: Medium     Type 2 diabetes mellitus without complication, without long-term current use of insulin (H) 10/30/2018     Priority: Medium     CARDIOVASCULAR SCREENING; LDL GOAL LESS THAN 160 05/10/2012     Priority: Medium        Past Medical History:    History reviewed. No pertinent past medical history.    Past Surgical History:    Past Surgical History:   Procedure Laterality Date     COLONOSCOPY N/A 3/30/2018    Procedure: COMBINED COLONOSCOPY, SINGLE OR MULTIPLE BIOPSY/POLYPECTOMY BY BIOPSY;  Colonoscopy, Polypectomy by  Biopsy;  Surgeon: Hesham Greer MD;  Location:  GI       Family History:    Family History   Problem Relation Age of Onset     Diabetes Mother      Diabetes Father      Myocardial Infarction Father      Diabetes Sister      Diabetes Son        Social History:  Marital Status:   [2]  Social History     Tobacco Use     Smoking status: Former Smoker     Smokeless tobacco: Never Used   Substance Use Topics     Alcohol use: Yes     Comment: rare     Drug use: Never        Medications:    blood glucose (NO BRAND SPECIFIED) lancets standard  blood glucose (NO BRAND SPECIFIED) test strip  blood glucose monitoring (NO BRAND SPECIFIED) meter device kit  LORazepam (ATIVAN) 0.5 MG tablet  metFORMIN (GLUCOPHAGE) 500 MG tablet  oxyCODONE (ROXICODONE) 5 MG tablet  tamsulosin (FLOMAX) 0.4 MG capsule          Review of Systems   All other systems reviewed and are negative.      Physical Exam   BP: (!) 169/97  Pulse: 94  Temp: 97.9  F (36.6  C)  Resp: 12  Weight: 94.3 kg (207 lb 14.4 oz)  SpO2: 97 %      Physical Exam  Vitals and nursing note reviewed.   Constitutional:       General: He is in acute distress.      Appearance: He is not diaphoretic.      Comments: Appears uncomfortable   HENT:      Head: Atraumatic.   Eyes:      General: No scleral icterus.     Pupils: Pupils are equal, round, and reactive to light.   Cardiovascular:      Rate and Rhythm: Normal rate and regular rhythm.      Heart sounds: Normal heart sounds.   Pulmonary:      Effort: Pulmonary effort is normal. No respiratory distress.      Breath sounds: Normal breath sounds.   Abdominal:      General: Bowel sounds are normal.      Palpations: Abdomen is soft. There is no mass.      Tenderness: There is abdominal tenderness in the right upper quadrant and right lower quadrant. There is guarding. There is no rebound.      Hernia: No hernia is present.   Musculoskeletal:         General: No tenderness.   Skin:     General: Skin is warm.      Findings: No  rash.   Neurological:      Mental Status: He is alert.         ED Course                 Procedures              Critical Care time:  none               Results for orders placed or performed during the hospital encounter of 07/15/22 (from the past 24 hour(s))   CBC with platelets differential    Narrative    The following orders were created for panel order CBC with platelets differential.  Procedure                               Abnormality         Status                     ---------                               -----------         ------                     CBC with platelets and d...[[id number]]  Abnormal            Final result                 Please view results for these tests on the individual orders.   Comprehensive metabolic panel   Result Value Ref Range    Sodium 138 133 - 144 mmol/L    Potassium 4.1 3.4 - 5.3 mmol/L    Chloride 102 94 - 109 mmol/L    Carbon Dioxide (CO2) 29 20 - 32 mmol/L    Anion Gap 7 3 - 14 mmol/L    Urea Nitrogen 14 7 - 30 mg/dL    Creatinine 1.08 0.66 - 1.25 mg/dL    Calcium 9.2 8.5 - 10.1 mg/dL    Glucose 271 (H) 70 - 99 mg/dL    Alkaline Phosphatase 83 40 - 150 U/L    AST 22 0 - 45 U/L    ALT 46 0 - 70 U/L    Protein Total 7.7 6.8 - 8.8 g/dL    Albumin 4.0 3.4 - 5.0 g/dL    Bilirubin Total 0.6 0.2 - 1.3 mg/dL    GFR Estimate 82 >60 mL/min/1.73m2   CBC with platelets and differential   Result Value Ref Range    WBC Count 14.8 (H) 4.0 - 11.0 10e3/uL    RBC Count 5.51 4.40 - 5.90 10e6/uL    Hemoglobin 16.0 13.3 - 17.7 g/dL    Hematocrit 45.2 40.0 - 53.0 %    MCV 82 78 - 100 fL    MCH 29.0 26.5 - 33.0 pg    MCHC 35.4 31.5 - 36.5 g/dL    RDW 12.4 10.0 - 15.0 %    Platelet Count 288 150 - 450 10e3/uL    % Neutrophils 89 %    % Lymphocytes 5 %    % Monocytes 6 %    % Eosinophils 0 %    % Basophils 0 %    % Immature Granulocytes 0 %    NRBCs per 100 WBC 0 <1 /100    Absolute Neutrophils 13.2 (H) 1.6 - 8.3 10e3/uL    Absolute Lymphocytes 0.7 (L) 0.8 - 5.3 10e3/uL    Absolute Monocytes  0.8 0.0 - 1.3 10e3/uL    Absolute Eosinophils 0.0 0.0 - 0.7 10e3/uL    Absolute Basophils 0.0 0.0 - 0.2 10e3/uL    Absolute Immature Granulocytes 0.0 <=0.4 10e3/uL    Absolute NRBCs 0.0 10e3/uL   CT Abdomen Pelvis w Contrast    Narrative    CT ABDOMEN PELVIS WITH CONTRAST 7/15/2022 11:48 AM    CLINICAL HISTORY: Right lower quadrant abdominal pain, vomiting,  abdominal distention.    TECHNIQUE: CT scan of the abdomen and pelvis was performed following  injection of IV contrast. Multiplanar reformats were obtained. Dose  reduction techniques were used.  CONTRAST: 90 mL Isovue 370     COMPARISON: 1/1/2020.     FINDINGS:   LOWER CHEST: Benign granuloma on the right lower lobe. No infiltrates  or effusions. Elevated right hemidiaphragm.    HEPATOBILIARY: Diffuse hepatic steatosis. No significant mass. No bile  duct dilatation. No calcified gallstones.    PANCREAS: No significant mass, duct dilatation, or inflammatory  change.    SPLEEN: Normal size.    ADRENAL GLANDS: No significant nodules.    KIDNEYS/BLADDER: 5 mm stone in the mid to distal right ureter near the  crossing of the iliac vasculature with mild proximal hydronephrosis  and stranding. No other urolithiasis.    BOWEL: No obstruction or inflammatory change.    PELVIC ORGANS: No pelvic masses.    ADDITIONAL FINDINGS: No ascites.    MUSCULOSKELETAL: No frankly destructive bony lesions.      Impression    IMPRESSION: 5 mm stone mid to distal right ureter with mild proximal  hydronephrosis and stranding.     MARGARITO GRAHAM MD         SYSTEM ID:  [id number]   UA with Microscopic reflex to Culture    Specimen: Urine, Midstream   Result Value Ref Range    Color Urine Straw Colorless, Straw, Light Yellow, Yellow    Appearance Urine Clear Clear    Glucose Urine >=1000 (A) Negative mg/dL    Bilirubin Urine Negative Negative    Ketones Urine Trace (A) Negative mg/dL    Specific Gravity Urine 1.010 1.003 - 1.035    Blood Urine Large (A) Negative    pH Urine 5.5 5.0 - 7.0     Protein Albumin Urine Negative Negative mg/dL    Urobilinogen Urine Normal Normal, 2.0 mg/dL    Nitrite Urine Negative Negative    Leukocyte Esterase Urine Negative Negative    Mucus Urine Present (A) None Seen /LPF    RBC Urine >182 (H) <=2 /HPF    WBC Urine 0 <=5 /HPF    Squamous Epithelials Urine <1 <=1 /HPF    Narrative    Urine Culture not indicated  Urine Culture not indicated       Medications   0.9% sodium chloride BOLUS (0 mLs Intravenous Stopped 7/15/22 1356)   Saline Bag 100mL  CT  flush use only (60 mLs Intravenous Given 7/15/22 1139)   iopamidol (ISOVUE-370) solution 500 mL (90 mLs Intravenous Given 7/15/22 1139)   ketorolac (TORADOL) injection 30 mg (30 mg Intravenous Given 7/15/22 1255)   LORazepam (ATIVAN) injection 1 mg (1 mg Intravenous Given 7/15/22 1401)   oxyCODONE (ROXICODONE) tablet 5 mg (5 mg Oral Given 7/15/22 1402)       Assessments & Plan (with Medical Decision Making)  Brendan is a 56-year-old male presenting to the emergency room with concerns of abdominal pain and pressure, possible constipation.  See history and focused physical exam as above  56-year-old male in moderate distress secondary to abdominal pain, vital signs are stable and he is afebrile.  He does have some tenderness in his right upper and lower quadrants, but no appreciable masses, no rebound, guarding, or rigidity.  Will insert peripheral IV and get a scan of his abdomen and pelvis, will also check lab work and will give him some medication to help.  He agrees to this plan  Labs and imaging as above.  He has a 5 mm ureteral stone on the right with signs of obstruction and hydronephrosis.  Urine sample did not reveal any signs of infection.  He had some relief with Toradol, but said that he was feeling some pressure come back.  Explained to him and his wife the likelihood of stone passing on its own.  Since we do not have any beds available for hospitalization and there are no beds available within the [facility], if  patient would require monitoring for ongoing IV pain medication and pain control, he would need to stay in the emergency room.  He does not want to do this and would like to try to go home.  He was given additional doses of pain medication, gave oral oxycodone and some IV Ativan to try and private side relief.  He did feel better after this and was wanting to be discharged.  A referral to urology was made electronically, and prescriptions for several different medications to help with his symptoms were sent to the pharmacy.  Advised to return if any worsening symptoms.  Discharged in no distress     I have reviewed the nursing notes.    I have reviewed the findings, diagnosis, plan and need for follow up with the patient.       Discharge Medication List as of 7/15/2022  2:34 PM      START taking these medications    Details   LORazepam (ATIVAN) 0.5 MG tablet Take 1 tablet (0.5 mg) by mouth every 6 hours as needed for nausea, pain or vomiting, Disp-12 tablet, R-0, E-Prescribe      oxyCODONE (ROXICODONE) 5 MG tablet Take 1 tablet (5 mg) by mouth every 6 hours as needed for pain, Disp-12 tablet, R-0, E-Prescribe      tamsulosin (FLOMAX) 0.4 MG capsule Take 1 capsule (0.4 mg) by mouth daily, Disp-7 capsule, R-0, E-Prescribe             Final diagnoses:   Hydronephrosis with urinary obstruction due to ureteral calculus       7/15/2022   [facility] EMERGENCY DEPT     Bessie Mora DO  07/15/22 2629

## 2022-07-18 ENCOUNTER — TELEPHONE (OUTPATIENT)
Dept: UROLOGY | Facility: CLINIC | Age: 57
End: 2022-07-18

## 2022-07-18 NOTE — TELEPHONE ENCOUNTER
[facility] Call Center    Phone Message    May a detailed message be left on voicemail: yes     Reason for Call: Other: Pt referred to Uro for kidney stones. Appt scheduled on 8/31. Pt stated he is unsure if he passed the stone. Not having any pain currently. Pt requesting clinic to call back      Please review and follow-up with patient with his questions. Thanks!    Action Taken: Other: [facility] Urology     Travel Screening: Not Applicable

## 2022-07-18 NOTE — TELEPHONE ENCOUNTER
Pt is unsure if he has passed a stone. Pt was taking flomax and pain meds. Pt states he felt a little irritation on Saturday. No pain or symptoms today. He will contact us on Thursday directly with an update, otherwise notify us before then if symptomatic.    Smitha KU RN Urology 7/18/2022 3:17 PM

## 2022-07-21 NOTE — TELEPHONE ENCOUNTER
Pt has remained asymptomatic. He will notify us if he decides to move forward with an appointment with Dr. Sofia.    Smitha KU RN Urology 7/21/2022 2:14 PM

## 2022-08-21 ENCOUNTER — HEALTH MAINTENANCE LETTER (OUTPATIENT)
Age: 57
End: 2022-08-21

## 2022-11-21 ENCOUNTER — HEALTH MAINTENANCE LETTER (OUTPATIENT)
Age: 57
End: 2022-11-21

## 2023-03-25 DIAGNOSIS — E11.9 TYPE 2 DIABETES MELLITUS WITHOUT COMPLICATION, WITHOUT LONG-TERM CURRENT USE OF INSULIN (H): ICD-10-CM

## 2023-03-28 NOTE — TELEPHONE ENCOUNTER
Pending Prescriptions:                       Disp   Refills    metFORMIN (GLUCOPHAGE) 500 MG tablet [Pha*90 tab*0            Sig: TAKE 2 TABLETS BY MOUTH EVERY MORNING AND 1           TABLET IN THE EVENING    Medication is being filled for 1 time german refill only due to:  Patient is due for DM check    future appt in 1 month scheduled    Please call and help schedule.  Thank you!

## 2023-04-16 ENCOUNTER — HEALTH MAINTENANCE LETTER (OUTPATIENT)
Age: 58
End: 2023-04-16

## 2023-04-20 ENCOUNTER — PATIENT OUTREACH (OUTPATIENT)
Dept: CARE COORDINATION | Facility: CLINIC | Age: 58
End: 2023-04-20
Payer: COMMERCIAL

## 2023-05-01 ENCOUNTER — TRANSFERRED RECORDS (OUTPATIENT)
Dept: MULTI SPECIALTY CLINIC | Facility: CLINIC | Age: 58
End: 2023-05-01

## 2023-05-01 LAB — RETINOPATHY: NORMAL

## 2023-05-23 ENCOUNTER — OFFICE VISIT (OUTPATIENT)
Dept: FAMILY MEDICINE | Facility: CLINIC | Age: 58
End: 2023-05-23
Payer: COMMERCIAL

## 2023-05-23 VITALS — BODY MASS INDEX: 28.71 KG/M2 | HEIGHT: 71 IN

## 2023-05-23 DIAGNOSIS — E11.9 TYPE 2 DIABETES MELLITUS WITHOUT COMPLICATION, WITHOUT LONG-TERM CURRENT USE OF INSULIN (H): ICD-10-CM

## 2023-05-23 PROCEDURE — 99214 OFFICE O/P EST MOD 30 MIN: CPT | Performed by: OBSTETRICS & GYNECOLOGY

## 2023-05-23 RX ORDER — LISINOPRIL 5 MG/1
2.5 TABLET ORAL DAILY
Qty: 45 TABLET | Refills: 3 | Status: SHIPPED | OUTPATIENT
Start: 2023-05-23 | End: 2023-10-27

## 2023-05-23 NOTE — PROGRESS NOTES
ASSESSMENT/PLAN:                                                        1.  Type 2 diabetes mellitus-control uncertain at this point.  We will check hemoglobin A1c and fasting lipids and CBC and basic panel sometime in the next several days.  He has gained some weight this winter and I encouraged him to get out and walk or do some exercise and watch his diet.  I encouraged him to keep that ophthalmology appointment 2 days from now.    2.  I suggested that he add lisinopril to his diabetic regimen for protection of his kidneys.  He will take 2.5 mg daily.  Stop if he develops any irritative cough.    3.  Since I am retiring next month we will set him up to see one of the internal medicine specialists to resume ongoing care sometime this fall.                                                                      SUBJECTIVE:                                                      Brendan is here because he made the appointment 3 months ago since he was having some leg pain.  Now the leg pain has resolved but he does need a refill of his metformin for diabetes and so we decided we should convert this into a visit to discuss his diabetes.    He checks his blood sugar each morning and over the winter he had some numbers that were higher even several that were in the 200s.  This morning his fasting blood sugar was 126.  They have been better this spring.  He has not had his A1c checked for quite a while.    He does plan to see an eye doctor this Thursday for his yearly diabetic check.    He has tolerated the metformin well.  Occasional loose stools.  Recently he has had several episodes when he was tired and that he rested for several hours and he felt much better.  No chest pain or dyspnea or any other unusual symptoms but he just felt fatigued.  He did not check his blood sugar during those episodes.  There were 2 episodes and they were  by a few weeks or more.        Patient Active Problem List   Diagnosis      CARDIOVASCULAR SCREENING; LDL GOAL LESS THAN 160     Hyperlipidemia LDL goal <100     Type 2 diabetes mellitus without complication, without long-term current use of insulin (H)     Past Surgical History:   Procedure Laterality Date     COLONOSCOPY N/A 3/30/2018    Procedure: COMBINED COLONOSCOPY, SINGLE OR MULTIPLE BIOPSY/POLYPECTOMY BY BIOPSY;  Colonoscopy, Polypectomy by Biopsy;  Surgeon: Hesham Greer MD;  Location:  GI       Social History     Tobacco Use     Smoking status: Former     Smokeless tobacco: Never   Vaping Use     Vaping status: Not on file   Substance Use Topics     Alcohol use: Yes     Comment: rare     Family History   Problem Relation Age of Onset     Diabetes Mother      Diabetes Father      Myocardial Infarction Father      Diabetes Sister      Diabetes Son          Current Outpatient Medications   Medication Sig Dispense Refill     blood glucose (NO BRAND SPECIFIED) lancets standard Use to test blood sugar 4 times daily or as directed. 120 each 4     blood glucose (NO BRAND SPECIFIED) test strip Use to test blood sugar 4 times daily or as directed. 200 strip 3     blood glucose monitoring (NO BRAND SPECIFIED) meter device kit Use to test blood sugar 4 times daily or as directed. 1 kit 0     LORazepam (ATIVAN) 0.5 MG tablet Take 1 tablet (0.5 mg) by mouth every 6 hours as needed for nausea, pain or vomiting 12 tablet 0     metFORMIN (GLUCOPHAGE) 500 MG tablet TAKE TWO TABLETS BY MOUTH EVERY morning AND TAKE ONE TABLET BY MOUTH EVERY EVENING 90 tablet 0     tamsulosin (FLOMAX) 0.4 MG capsule Take 1 capsule (0.4 mg) by mouth daily 7 capsule 0       ROS:  A 12 point systems review is negative except for what is listed above in the Subjective history.        Wt Readings from Last 3 Encounters:   07/15/22 94.3 kg (207 lb 14.4 oz)   03/29/22 95.7 kg (211 lb)   01/29/21 97.3 kg (214 lb 8 oz)                     BP Readings from Last 6 Encounters:   07/15/22 (!) 152/78   03/29/22 136/82    01/29/21 131/83   01/26/21 124/81   01/07/20 126/68   01/01/20 (!) 132/98          OBJECTIVE:                                                    Vital signs: There were no vitals taken for this visit.  /78   P 76   R 15  Wt 205 lb    HEENT is normal.  Fundi are benign- disc margins are sharp. No papilledema noted.      Neck is supple, mobile, no adenoapthy or masses palpable. Normal range of motion noted.     Chest is clear to auscultation. No wheezes, rales or rhonchi heard.  cardiac exam is normal with s1, s2, no murmurs or adventitious sounds.Normal rate and rhythm is heard.     Abdomen is soft,  nondistended, No masses felt.No HSM. No guarding or rigidity or rebound noted. Palpation reveals  no    tenderness   Normal bowel sounds heard.     Exam of his feet and hands is normal.  He has normal capillary refill in the toes and normal pedal pulses and normal sensation to light touch in the feet.  No trophic skin changes.        A total of 30 minutes were spent in today's visit including the time spent with  the patient in addition to the time spent just prior to the visit reviewing the chart  and then charting afterwards on this patient today.      Berto Haq MD  [facility]       The above information was dictating using Dragon voice software and as a result there may be some irregularities that were not detected in my review of this note.                                                                        Answers for HPI/ROS submitted by the patient on 5/22/2023  Frequency of checking blood sugars:: one time daily  What time of day are you checking your blood sugars : before meals  Have you had any blood sugars above 200?: No  Have you had any blood sugars below 70?: No  Hypoglycemia symptoms:: weakness  Diabetic concerns:: none  Paraesthesia present:: none of these symptoms  Have you had a diabetic eye exam within the last year?: Yes  Date of last eye exam: : may  2022  Where was this eye exam done?: [facility]  On average, how many sweetened beverages do you drink each day (Examples: soda, juice, sweet tea, etc.  Do NOT count diet or artificially sweetened beverages)?: 1  How many minutes a day do you exercise enough to make your heart beat faster?: 10 to 19  How many days a week do you exercise enough to make your heart beat faster?: 4  How many days per week do you miss taking your medication?: 0

## 2023-05-25 ENCOUNTER — LAB (OUTPATIENT)
Dept: LAB | Facility: CLINIC | Age: 58
End: 2023-05-25
Payer: COMMERCIAL

## 2023-05-25 DIAGNOSIS — E11.9 TYPE 2 DIABETES MELLITUS WITHOUT COMPLICATION, WITHOUT LONG-TERM CURRENT USE OF INSULIN (H): ICD-10-CM

## 2023-05-25 LAB
ANION GAP SERPL CALCULATED.3IONS-SCNC: 10 MMOL/L (ref 7–15)
BUN SERPL-MCNC: 12.3 MG/DL (ref 6–20)
CALCIUM SERPL-MCNC: 9.2 MG/DL (ref 8.6–10)
CHLORIDE SERPL-SCNC: 100 MMOL/L (ref 98–107)
CHOLEST SERPL-MCNC: 162 MG/DL
CREAT SERPL-MCNC: 0.91 MG/DL (ref 0.67–1.17)
DEPRECATED HCO3 PLAS-SCNC: 28 MMOL/L (ref 22–29)
ERYTHROCYTE [DISTWIDTH] IN BLOOD BY AUTOMATED COUNT: 12.3 % (ref 10–15)
GFR SERPL CREATININE-BSD FRML MDRD: >90 ML/MIN/1.73M2
GLUCOSE SERPL-MCNC: 161 MG/DL (ref 70–99)
HBA1C MFR BLD: 7.6 %
HCT VFR BLD AUTO: 46.4 % (ref 40–53)
HDLC SERPL-MCNC: 34 MG/DL
HGB BLD-MCNC: 15.9 G/DL (ref 13.3–17.7)
LDLC SERPL CALC-MCNC: 63 MG/DL
MCH RBC QN AUTO: 28.3 PG (ref 26.5–33)
MCHC RBC AUTO-ENTMCNC: 34.3 G/DL (ref 31.5–36.5)
MCV RBC AUTO: 83 FL (ref 78–100)
NONHDLC SERPL-MCNC: 128 MG/DL
PLATELET # BLD AUTO: 283 10E3/UL (ref 150–450)
POTASSIUM SERPL-SCNC: 4.4 MMOL/L (ref 3.4–5.3)
RBC # BLD AUTO: 5.61 10E6/UL (ref 4.4–5.9)
SODIUM SERPL-SCNC: 138 MMOL/L (ref 136–145)
TRIGL SERPL-MCNC: 323 MG/DL
WBC # BLD AUTO: 6.5 10E3/UL (ref 4–11)

## 2023-05-25 PROCEDURE — 80061 LIPID PANEL: CPT

## 2023-05-25 PROCEDURE — 83036 HEMOGLOBIN GLYCOSYLATED A1C: CPT

## 2023-05-25 PROCEDURE — 85027 COMPLETE CBC AUTOMATED: CPT

## 2023-05-25 PROCEDURE — 36415 COLL VENOUS BLD VENIPUNCTURE: CPT

## 2023-05-25 PROCEDURE — 80048 BASIC METABOLIC PNL TOTAL CA: CPT

## 2023-06-02 ENCOUNTER — HEALTH MAINTENANCE LETTER (OUTPATIENT)
Age: 58
End: 2023-06-02

## 2023-09-27 DIAGNOSIS — E11.9 TYPE 2 DIABETES MELLITUS WITHOUT COMPLICATION, WITHOUT LONG-TERM CURRENT USE OF INSULIN (H): ICD-10-CM

## 2023-10-27 ENCOUNTER — OFFICE VISIT (OUTPATIENT)
Dept: INTERNAL MEDICINE | Facility: CLINIC | Age: 58
End: 2023-10-27
Payer: COMMERCIAL

## 2023-10-27 VITALS
HEART RATE: 90 BPM | RESPIRATION RATE: 12 BRPM | DIASTOLIC BLOOD PRESSURE: 74 MMHG | OXYGEN SATURATION: 96 % | BODY MASS INDEX: 29.05 KG/M2 | HEIGHT: 71 IN | SYSTOLIC BLOOD PRESSURE: 124 MMHG | TEMPERATURE: 97.7 F | WEIGHT: 207.5 LBS

## 2023-10-27 DIAGNOSIS — Z11.4 SCREENING FOR HIV (HUMAN IMMUNODEFICIENCY VIRUS): Primary | ICD-10-CM

## 2023-10-27 DIAGNOSIS — Z12.5 SCREENING FOR PROSTATE CANCER: ICD-10-CM

## 2023-10-27 DIAGNOSIS — E11.9 TYPE 2 DIABETES MELLITUS WITHOUT COMPLICATION, WITHOUT LONG-TERM CURRENT USE OF INSULIN (H): ICD-10-CM

## 2023-10-27 DIAGNOSIS — Z11.59 NEED FOR HEPATITIS C SCREENING TEST: ICD-10-CM

## 2023-10-27 LAB
ALBUMIN SERPL BCG-MCNC: 4.4 G/DL (ref 3.5–5.2)
ALP SERPL-CCNC: 87 U/L (ref 40–129)
ALT SERPL W P-5'-P-CCNC: 38 U/L (ref 0–70)
ANION GAP SERPL CALCULATED.3IONS-SCNC: 13 MMOL/L (ref 7–15)
AST SERPL W P-5'-P-CCNC: 24 U/L (ref 0–45)
BILIRUB SERPL-MCNC: 0.6 MG/DL
BUN SERPL-MCNC: 7.9 MG/DL (ref 6–20)
CALCIUM SERPL-MCNC: 9.1 MG/DL (ref 8.6–10)
CHLORIDE SERPL-SCNC: 100 MMOL/L (ref 98–107)
CREAT SERPL-MCNC: 0.85 MG/DL (ref 0.67–1.17)
CREAT UR-MCNC: 272 MG/DL
DEPRECATED HCO3 PLAS-SCNC: 26 MMOL/L (ref 22–29)
EGFRCR SERPLBLD CKD-EPI 2021: >90 ML/MIN/1.73M2
GLUCOSE SERPL-MCNC: 188 MG/DL (ref 70–99)
HBA1C MFR BLD: 8.4 %
HCV AB SERPL QL IA: NONREACTIVE
HIV 1+2 AB+HIV1 P24 AG SERPL QL IA: NONREACTIVE
MICROALBUMIN UR-MCNC: 15.2 MG/L
MICROALBUMIN/CREAT UR: 5.59 MG/G CR (ref 0–17)
POTASSIUM SERPL-SCNC: 4.1 MMOL/L (ref 3.4–5.3)
PROT SERPL-MCNC: 7.2 G/DL (ref 6.4–8.3)
PSA SERPL DL<=0.01 NG/ML-MCNC: 0.67 NG/ML (ref 0–3.5)
SODIUM SERPL-SCNC: 139 MMOL/L (ref 135–145)

## 2023-10-27 PROCEDURE — 99213 OFFICE O/P EST LOW 20 MIN: CPT | Performed by: INTERNAL MEDICINE

## 2023-10-27 PROCEDURE — 82570 ASSAY OF URINE CREATININE: CPT | Performed by: INTERNAL MEDICINE

## 2023-10-27 PROCEDURE — 36415 COLL VENOUS BLD VENIPUNCTURE: CPT | Performed by: INTERNAL MEDICINE

## 2023-10-27 PROCEDURE — 86803 HEPATITIS C AB TEST: CPT | Performed by: INTERNAL MEDICINE

## 2023-10-27 PROCEDURE — G0103 PSA SCREENING: HCPCS | Performed by: INTERNAL MEDICINE

## 2023-10-27 PROCEDURE — 82043 UR ALBUMIN QUANTITATIVE: CPT | Performed by: INTERNAL MEDICINE

## 2023-10-27 PROCEDURE — 99207 PR FOOT EXAM NO CHARGE: CPT | Performed by: INTERNAL MEDICINE

## 2023-10-27 PROCEDURE — 80053 COMPREHEN METABOLIC PANEL: CPT | Performed by: INTERNAL MEDICINE

## 2023-10-27 PROCEDURE — 87389 HIV-1 AG W/HIV-1&-2 AB AG IA: CPT | Performed by: INTERNAL MEDICINE

## 2023-10-27 PROCEDURE — 83036 HEMOGLOBIN GLYCOSYLATED A1C: CPT | Performed by: INTERNAL MEDICINE

## 2023-10-27 RX ORDER — LISINOPRIL 5 MG/1
2.5 TABLET ORAL DAILY
Qty: 45 TABLET | Refills: 3 | Status: SHIPPED | OUTPATIENT
Start: 2023-10-27

## 2023-10-27 NOTE — PROGRESS NOTES
Arabella Cardona is a 58 year old, presenting for the following health issues:  No chief complaint on file.        10/27/2023     7:42 AM   Additional Questions   Roomed by Radha pollard       History of Present Illness       Diabetes:   He presents for follow up of diabetes.  He is checking home blood glucose a few times a month.   He checks blood glucose before meals.  Blood glucose is never over 200 and never under 70. He is aware of hypoglycemia symptoms including weakness.    He has no concerns regarding his diabetes at this time.   He is not experiencing numbness or burning in feet, excessive thirst, blurry vision, weight changes or redness, sores or blisters on feet. The patient has had a diabetic eye exam in the last 12 months. Eye exam performed on Spring of 2023. Location of last eye exam [facility].        He eats 2-3 servings of fruits and vegetables daily.He consumes 1 sweetened beverage(s) daily.He exercises with enough effort to increase his heart rate 10 to 19 minutes per day.  He exercises with enough effort to increase his heart rate 3 or less days per week.   He is taking medications regularly.          EMR reviewed including:             Complaint, History of Chief Complaint, Corresponding Review of Systems, and Complaint Specific Physical Examination.    #1   Follow-up on type 2 diabetes  Treated with metformin.  Last A1c was7.6 in May.  Patient denies polyuria or polydipsia.  Has had no hypoglycemic episodes.  Denies diarrhea from the medication.  He is concurrently taking ACE inhibitor but no statin or aspirin at this time.        Exam:   LUNGS: clear bilaterally, airflow is brisk, no intercostal retraction or stridor is noted. No coughing is noted during visit.   HEART:  regular without rubs, clicks, gallops, or murmurs. PMI is nondisplaced. Upstrokes are brisk. S1,S2 are heard.   GI: Abdomen is soft, without rebound, guarding or tenderness. Bowel sounds are appropriate. No renal bruits  "are heard.   NEURO: Pt is alert and appropriate. No neurologic lateralization is noted. Cranial nerves 2-12 are intact. Peripheral sensory and motor function are grossly normal.    Feet: no evidence of skin breakdown or ulceration is noted.  Sensation is intact to monofilament and vibration.  Pulses are strong, capillary refill is brisk.          Patient has been interviewed, applicable history and applied review of systems have been performed.    Vital Signs:   /74   Pulse 90   Temp 97.7  F (36.5  C)   Resp 12   Ht 1.81 m (5' 11.26\")   Wt 94.1 kg (207 lb 8 oz)   SpO2 96%   BMI 28.73 kg/m        Decision Making    Problem and Complexity     1. Type 2 diabetes mellitus without complication, without long-term current use of insulin (H)  Check lab work occluding A1c and CMP.  Recent LDL was 63 without statin therapy.  Discussed questionable benefits of aspirin given current contradictory studies  - Albumin Random Urine Quantitative with Creat Ratio; Future  - Hemoglobin A1c; Future  - Comprehensive metabolic panel (BMP + Alb, Alk Phos, ALT, AST, Total. Bili, TP); Future  - FOOT EXAM    2. Screening for HIV (human immunodeficiency virus)  Screening  - HIV Antigen Antibody Combo; Future    3. Need for hepatitis C screening test  Screening  - Hepatitis C Screen Reflex to HCV RNA Quant and Genotype; Future    4. Screening for prostate cancer  Screening  - PSA, screen; Future                                FOLLOW UP   I have asked the patient to make an appointment for followup with me in 6 months or as predicated by results      Regarding routine vaccinations:  I have reviewed the patient's vaccination schedule and discussed the benefits of prophylactic vaccination in detail.  I recommend the patient contact their pharmacist for vaccinations.  Discussed that most insurance companies now favor reimbursement to the pharmacies and it will financially behoove the patient to have vaccinations performed at their " pharmacy.        I have carefully explained the diagnosis and treatment options to the patient.  The patient has displayed an understanding of the above, and all subsequent questions were answered.      DO BALBIR Collado    Portions of this note were produced using [employer]  Although every attempt at real-time proof reading has been made, occasional grammar/syntax errors may have been missed.

## 2023-11-04 ENCOUNTER — TRANSFERRED RECORDS (OUTPATIENT)
Dept: MULTI SPECIALTY CLINIC | Facility: CLINIC | Age: 58
End: 2023-11-04

## 2023-11-04 LAB — RETINOPATHY: NORMAL

## 2024-01-23 DIAGNOSIS — E11.9 TYPE 2 DIABETES MELLITUS WITHOUT COMPLICATION, WITHOUT LONG-TERM CURRENT USE OF INSULIN (H): ICD-10-CM

## 2024-01-23 RX ORDER — LISINOPRIL 5 MG/1
2.5 TABLET ORAL DAILY
Qty: 45 TABLET | Refills: 3 | OUTPATIENT
Start: 2024-01-23

## 2024-01-30 DIAGNOSIS — E11.9 TYPE 2 DIABETES MELLITUS WITHOUT COMPLICATION, WITHOUT LONG-TERM CURRENT USE OF INSULIN (H): ICD-10-CM

## 2024-01-30 RX ORDER — LISINOPRIL 5 MG/1
2.5 TABLET ORAL DAILY
Qty: 45 TABLET | Refills: 3 | OUTPATIENT
Start: 2024-01-30

## 2024-02-20 DIAGNOSIS — E11.9 TYPE 2 DIABETES MELLITUS WITHOUT COMPLICATION, WITHOUT LONG-TERM CURRENT USE OF INSULIN (H): ICD-10-CM

## 2024-02-20 RX ORDER — LISINOPRIL 5 MG/1
2.5 TABLET ORAL DAILY
Qty: 45 TABLET | Refills: 3 | OUTPATIENT
Start: 2024-02-20

## 2024-03-05 DIAGNOSIS — E11.9 TYPE 2 DIABETES MELLITUS WITHOUT COMPLICATION, WITHOUT LONG-TERM CURRENT USE OF INSULIN (H): ICD-10-CM

## 2024-03-05 RX ORDER — LISINOPRIL 5 MG/1
2.5 TABLET ORAL DAILY
Qty: 45 TABLET | Refills: 3 | OUTPATIENT
Start: 2024-03-05

## 2024-03-07 DIAGNOSIS — E11.9 TYPE 2 DIABETES MELLITUS WITHOUT COMPLICATION, WITHOUT LONG-TERM CURRENT USE OF INSULIN (H): ICD-10-CM

## 2024-03-18 DIAGNOSIS — E11.9 TYPE 2 DIABETES MELLITUS WITHOUT COMPLICATION, WITHOUT LONG-TERM CURRENT USE OF INSULIN (H): ICD-10-CM

## 2024-03-18 RX ORDER — LISINOPRIL 5 MG/1
2.5 TABLET ORAL DAILY
Qty: 45 TABLET | Refills: 0 | OUTPATIENT
Start: 2024-03-18

## 2024-06-23 ENCOUNTER — HEALTH MAINTENANCE LETTER (OUTPATIENT)
Age: 59
End: 2024-06-23

## 2024-07-01 DIAGNOSIS — E11.9 TYPE 2 DIABETES MELLITUS WITHOUT COMPLICATION, WITHOUT LONG-TERM CURRENT USE OF INSULIN (H): ICD-10-CM

## 2024-08-12 ENCOUNTER — OFFICE VISIT (OUTPATIENT)
Dept: INTERNAL MEDICINE | Facility: CLINIC | Age: 59
End: 2024-08-12
Payer: COMMERCIAL

## 2024-08-12 VITALS
BODY MASS INDEX: 28.13 KG/M2 | WEIGHT: 207.7 LBS | DIASTOLIC BLOOD PRESSURE: 82 MMHG | SYSTOLIC BLOOD PRESSURE: 118 MMHG | RESPIRATION RATE: 14 BRPM | TEMPERATURE: 98.1 F | HEART RATE: 84 BPM | OXYGEN SATURATION: 96 % | HEIGHT: 72 IN

## 2024-08-12 DIAGNOSIS — R35.0 URINARY FREQUENCY: ICD-10-CM

## 2024-08-12 DIAGNOSIS — M79.671 PAIN IN BOTH FEET: ICD-10-CM

## 2024-08-12 DIAGNOSIS — E11.9 TYPE 2 DIABETES MELLITUS WITHOUT COMPLICATION, WITHOUT LONG-TERM CURRENT USE OF INSULIN (H): Primary | ICD-10-CM

## 2024-08-12 DIAGNOSIS — M79.672 PAIN IN BOTH FEET: ICD-10-CM

## 2024-08-12 LAB
ALBUMIN SERPL BCG-MCNC: 4.3 G/DL (ref 3.5–5.2)
ALP SERPL-CCNC: 83 U/L (ref 40–150)
ALT SERPL W P-5'-P-CCNC: 41 U/L (ref 0–70)
ANION GAP SERPL CALCULATED.3IONS-SCNC: 8 MMOL/L (ref 7–15)
AST SERPL W P-5'-P-CCNC: 27 U/L (ref 0–45)
BILIRUB SERPL-MCNC: 0.6 MG/DL
BUN SERPL-MCNC: 9.1 MG/DL (ref 6–20)
CALCIUM SERPL-MCNC: 9.2 MG/DL (ref 8.8–10.4)
CHLORIDE SERPL-SCNC: 100 MMOL/L (ref 98–107)
CHOLEST SERPL-MCNC: 212 MG/DL
CREAT SERPL-MCNC: 0.9 MG/DL (ref 0.67–1.17)
EGFRCR SERPLBLD CKD-EPI 2021: >90 ML/MIN/1.73M2
FASTING STATUS PATIENT QL REPORTED: YES
FASTING STATUS PATIENT QL REPORTED: YES
GLUCOSE SERPL-MCNC: 228 MG/DL (ref 70–99)
HBA1C MFR BLD: 9.6 %
HCO3 SERPL-SCNC: 30 MMOL/L (ref 22–29)
HDLC SERPL-MCNC: 35 MG/DL
LDLC SERPL CALC-MCNC: ABNORMAL MG/DL
LDLC SERPL DIRECT ASSAY-MCNC: 91 MG/DL
NONHDLC SERPL-MCNC: 177 MG/DL
POTASSIUM SERPL-SCNC: 4.4 MMOL/L (ref 3.4–5.3)
PROT SERPL-MCNC: 7.3 G/DL (ref 6.4–8.3)
PSA SERPL DL<=0.01 NG/ML-MCNC: 0.66 NG/ML (ref 0–3.5)
SODIUM SERPL-SCNC: 138 MMOL/L (ref 135–145)
TRIGL SERPL-MCNC: 563 MG/DL

## 2024-08-12 PROCEDURE — 80061 LIPID PANEL: CPT | Performed by: INTERNAL MEDICINE

## 2024-08-12 PROCEDURE — 80053 COMPREHEN METABOLIC PANEL: CPT | Performed by: INTERNAL MEDICINE

## 2024-08-12 PROCEDURE — 99214 OFFICE O/P EST MOD 30 MIN: CPT | Performed by: INTERNAL MEDICINE

## 2024-08-12 PROCEDURE — G2211 COMPLEX E/M VISIT ADD ON: HCPCS | Performed by: INTERNAL MEDICINE

## 2024-08-12 PROCEDURE — 83036 HEMOGLOBIN GLYCOSYLATED A1C: CPT | Performed by: INTERNAL MEDICINE

## 2024-08-12 PROCEDURE — 83721 ASSAY OF BLOOD LIPOPROTEIN: CPT | Performed by: INTERNAL MEDICINE

## 2024-08-12 PROCEDURE — 36415 COLL VENOUS BLD VENIPUNCTURE: CPT | Performed by: INTERNAL MEDICINE

## 2024-08-12 PROCEDURE — G0103 PSA SCREENING: HCPCS | Performed by: INTERNAL MEDICINE

## 2024-08-12 ASSESSMENT — PAIN SCALES - GENERAL: PAINLEVEL: MODERATE PAIN (4)

## 2024-08-12 NOTE — PROGRESS NOTES
Arabella Cardona is a 58 year old, presenting for the following health issues:  Diabetes        10/27/2023     7:42 AM   Additional Questions   Roomed by Radha pollard       Via the Health Maintenance questionnaire, the patient has reported the following services have been completed -Eye Exam: [facility] 2023-11-04, this information has been sent to the abstraction team.  History of Present Illness       Diabetes:   He presents for follow up of diabetes.  He is checking home blood glucose a few times a month.   He checks blood glucose before meals.  Blood glucose is sometimes over 200 and never under 70.  When his blood glucose is low, the patient is asymptomatic for confusion, blurred vision, lethargy and reports not feeling dizzy, shaky, or weak.   He has no concerns regarding his diabetes at this time.  He is having excessive thirst.  The patient has had a diabetic eye exam in the last 12 months. Eye exam performed on Fall of 2023. Location of last eye exam [facility].        Reason for visit:  General Health and Diabetes    He eats 2-3 servings of fruits and vegetables daily.He consumes 1 sweetened beverage(s) daily.He exercises with enough effort to increase his heart rate 10 to 19 minutes per day.  He exercises with enough effort to increase his heart rate 3 or less days per week.   He is taking medications regularly.                EMR reviewed including:             Complaint, History of Chief Complaint, Corresponding Review of Systems, and Complaint Specific Physical Examination.    #1   Follow-up on type 2 diabetes  Patient currently taking metformin.  Notes diarrhea more frequently than previous.  Denies any abdominal pain or cramping.  Notes that blood sugars are generally under 200.  Last A1c was 8.4 in October of last year.  Denies polyuria or polydipsia but does have some hesitancy.          Exam:   LUNGS: clear bilaterally, airflow is brisk, no intercostal retraction or stridor is noted. No  "coughing is noted during visit.   HEART:  regular without rubs, clicks, gallops, or murmurs. PMI is nondisplaced. Upstrokes are brisk. S1,S2 are heard.   GI: Abdomen is soft, without rebound, guarding or tenderness. Bowel sounds are appropriate. No renal bruits are heard.   NEURO: Pt is alert and appropriate. No neurologic lateralization is noted. Cranial nerves 2-12 are intact. Peripheral sensory and motor function are grossly normal.       #2   Foot pain  Seems to be associated more with his foot where an activity.  No history of trauma.  No burning, pins-and-needles, or discomfort at rest.  No back pain or radicular pain into his posterior legs.          Exam:   Feet: no evidence of skin breakdown or ulceration is noted.  Sensation is intact to monofilament and vibration.  Pulses are strong, capillary refill is brisk.    #3   Some urinary frequency.  Only gets up once or twice per night.  Notes caliber has decreased only slightly.  Notes mild hesitancy.  Denies any suprapubic pain or fullness.  Denies flank pain or discomfort.          Exam:   URINARY: Rodri's punch is negative. No suprapubic tenderness is noted with palpation.        Patient has been interviewed, applicable history and applied review of systems have been performed.    Vital Signs:   /82   Pulse 84   Temp 98.1  F (36.7  C)   Resp 14   Ht 1.84 m (6' 0.44\")   Wt 94.2 kg (207 lb 11.2 oz)   SpO2 96%   BMI 27.83 kg/m        Decision Making    Problem and Complexity     1. Type 2 diabetes mellitus without complication, without long-term current use of insulin (H)  Check lab work.  Discussed adding sulfonylurea and decreasing his metformin dosage to eliminate some of his GI symptomatology  - HEMOGLOBIN A1C; Future  - Lipid panel reflex to direct LDL Non-fasting; Future  - Comprehensive metabolic panel (BMP + Alb, Alk Phos, ALT, AST, Total. Bili, TP); Future  - HEMOGLOBIN A1C  - Lipid panel reflex to direct LDL Non-fasting  - Comprehensive " metabolic panel (BMP + Alb, Alk Phos, ALT, AST, Total. Bili, TP)    2. Urinary frequency  Rule out UTI.  Check PSA.  Discussed BPH  - PSA, screen; Future  - PSA, screen  - UA Macroscopic with reflex to Microscopic and Culture - Lab Collect; Future    3. Pain in both feet  Recommend changing shoes.  Will forward to podiatry symptoms do not improve.  Does not appear to be radicular or neuropathic                                FOLLOW UP   I have asked the patient to make an appointment for followup with me in 1 week to discuss his diabetes and the results    Regarding routine vaccinations:  I have reviewed the patient's vaccination schedule and discussed the benefits of prophylactic vaccination in detail.  I recommend the patient contact their pharmacist for vaccinations.  Discussed that most insurance companies now favor reimbursement to the pharmacies and it will financially behoove the patient to have vaccinations performed at their pharmacy.        I have carefully explained the diagnosis and treatment options to the patient.  The patient has displayed an understanding of the above, and all subsequent questions were answered.      DO BALBIR Collado    Portions of this note were produced using [employer]  Although every attempt at real-time proof reading has been made, occasional grammar/syntax errors may have been missed.

## 2024-08-15 RX ORDER — GLIMEPIRIDE 2 MG/1
2 TABLET ORAL
Qty: 90 TABLET | Refills: 0 | Status: SHIPPED | OUTPATIENT
Start: 2024-08-15

## 2024-09-16 ENCOUNTER — MYC REFILL (OUTPATIENT)
Dept: INTERNAL MEDICINE | Facility: CLINIC | Age: 59
End: 2024-09-16
Payer: COMMERCIAL

## 2024-09-16 DIAGNOSIS — E11.9 TYPE 2 DIABETES MELLITUS WITHOUT COMPLICATION, WITHOUT LONG-TERM CURRENT USE OF INSULIN (H): ICD-10-CM

## 2024-11-09 DIAGNOSIS — E11.9 TYPE 2 DIABETES MELLITUS WITHOUT COMPLICATION, WITHOUT LONG-TERM CURRENT USE OF INSULIN (H): ICD-10-CM

## 2024-11-10 DIAGNOSIS — E11.9 TYPE 2 DIABETES MELLITUS WITHOUT COMPLICATION, WITHOUT LONG-TERM CURRENT USE OF INSULIN (H): ICD-10-CM

## 2024-11-11 RX ORDER — GLIMEPIRIDE 2 MG/1
2 TABLET ORAL
Qty: 90 TABLET | Refills: 0 | Status: SHIPPED | OUTPATIENT
Start: 2024-11-11

## 2025-01-12 DIAGNOSIS — E11.9 TYPE 2 DIABETES MELLITUS WITHOUT COMPLICATION, WITHOUT LONG-TERM CURRENT USE OF INSULIN (H): ICD-10-CM

## 2025-02-06 DIAGNOSIS — E11.9 TYPE 2 DIABETES MELLITUS WITHOUT COMPLICATION, WITHOUT LONG-TERM CURRENT USE OF INSULIN (H): ICD-10-CM

## 2025-02-06 RX ORDER — GLIMEPIRIDE 2 MG/1
2 TABLET ORAL
Qty: 90 TABLET | Refills: 0 | Status: SHIPPED | OUTPATIENT
Start: 2025-02-06

## 2025-02-16 NOTE — TELEPHONE ENCOUNTER
Pharmacy requested refills that are already active on file. Refused request to pharmacy.  
16-Feb-2025 13:57

## 2025-03-02 ENCOUNTER — HEALTH MAINTENANCE LETTER (OUTPATIENT)
Age: 60
End: 2025-03-02

## 2025-03-05 DIAGNOSIS — E11.9 TYPE 2 DIABETES MELLITUS WITHOUT COMPLICATION, WITHOUT LONG-TERM CURRENT USE OF INSULIN (H): ICD-10-CM

## 2025-05-01 DIAGNOSIS — E11.9 TYPE 2 DIABETES MELLITUS WITHOUT COMPLICATION, WITHOUT LONG-TERM CURRENT USE OF INSULIN (H): ICD-10-CM

## 2025-05-01 RX ORDER — GLIMEPIRIDE 2 MG/1
2 TABLET ORAL
Qty: 90 TABLET | Refills: 0 | Status: SHIPPED | OUTPATIENT
Start: 2025-05-01

## 2025-05-05 ENCOUNTER — TELEPHONE (OUTPATIENT)
Dept: FAMILY MEDICINE | Facility: CLINIC | Age: 60
End: 2025-05-05
Payer: COMMERCIAL

## 2025-05-05 NOTE — TELEPHONE ENCOUNTER
Patient Quality Outreach    Patient is due for the following:   Diabetes -  A1C, Microalbumin, and Foot Exam  Colon Cancer Screening  Physical Preventive Adult Physical    Action(s) Taken:   Schedule a Adult Preventative    Type of outreach:    Sent [employer] message.    Questions for provider review:    None         Venita Boyce MA  Chart routed to None.

## 2025-06-30 DIAGNOSIS — E11.9 TYPE 2 DIABETES MELLITUS WITHOUT COMPLICATION, WITHOUT LONG-TERM CURRENT USE OF INSULIN (H): ICD-10-CM

## 2025-07-02 ENCOUNTER — OFFICE VISIT (OUTPATIENT)
Dept: INTERNAL MEDICINE | Facility: CLINIC | Age: 60
End: 2025-07-02
Payer: COMMERCIAL

## 2025-07-02 VITALS
RESPIRATION RATE: 16 BRPM | WEIGHT: 212.2 LBS | TEMPERATURE: 97.9 F | SYSTOLIC BLOOD PRESSURE: 137 MMHG | HEIGHT: 72 IN | OXYGEN SATURATION: 96 % | DIASTOLIC BLOOD PRESSURE: 80 MMHG | HEART RATE: 73 BPM | BODY MASS INDEX: 28.74 KG/M2

## 2025-07-02 DIAGNOSIS — K42.9 UMBILICAL HERNIA WITHOUT OBSTRUCTION AND WITHOUT GANGRENE: ICD-10-CM

## 2025-07-02 DIAGNOSIS — Z12.5 SCREENING FOR PROSTATE CANCER: ICD-10-CM

## 2025-07-02 DIAGNOSIS — Z12.11 SCREEN FOR COLON CANCER: ICD-10-CM

## 2025-07-02 DIAGNOSIS — E11.9 TYPE 2 DIABETES MELLITUS WITHOUT COMPLICATION, WITHOUT LONG-TERM CURRENT USE OF INSULIN (H): Primary | ICD-10-CM

## 2025-07-02 DIAGNOSIS — L82.1 SEBORRHEIC KERATOSIS: ICD-10-CM

## 2025-07-02 LAB
ALBUMIN SERPL BCG-MCNC: 4.2 G/DL (ref 3.5–5.2)
ALP SERPL-CCNC: 75 U/L (ref 40–150)
ALT SERPL W P-5'-P-CCNC: 36 U/L (ref 0–70)
ANION GAP SERPL CALCULATED.3IONS-SCNC: 7 MMOL/L (ref 7–15)
AST SERPL W P-5'-P-CCNC: 23 U/L (ref 0–45)
BILIRUB SERPL-MCNC: 0.5 MG/DL
BUN SERPL-MCNC: 10.3 MG/DL (ref 6–20)
CALCIUM SERPL-MCNC: 9.1 MG/DL (ref 8.8–10.4)
CHLORIDE SERPL-SCNC: 100 MMOL/L (ref 98–107)
CREAT SERPL-MCNC: 0.84 MG/DL (ref 0.67–1.17)
CREAT UR-MCNC: 183.6 MG/DL
EGFRCR SERPLBLD CKD-EPI 2021: >90 ML/MIN/1.73M2
ERYTHROCYTE [DISTWIDTH] IN BLOOD BY AUTOMATED COUNT: 12.3 % (ref 10–15)
EST. AVERAGE GLUCOSE BLD GHB EST-MCNC: 180 MG/DL
GLUCOSE SERPL-MCNC: 171 MG/DL (ref 70–99)
HBA1C MFR BLD: 7.9 %
HCO3 SERPL-SCNC: 31 MMOL/L (ref 22–29)
HCT VFR BLD AUTO: 44.7 % (ref 40–53)
HGB BLD-MCNC: 15.9 G/DL (ref 13.3–17.7)
MCH RBC QN AUTO: 29.2 PG (ref 26.5–33)
MCHC RBC AUTO-ENTMCNC: 35.6 G/DL (ref 31.5–36.5)
MCV RBC AUTO: 82 FL (ref 78–100)
MICROALBUMIN UR-MCNC: <12 MG/L
MICROALBUMIN/CREAT UR: NORMAL MG/G{CREAT}
PLATELET # BLD AUTO: 251 10E3/UL (ref 150–450)
POTASSIUM SERPL-SCNC: 4.3 MMOL/L (ref 3.4–5.3)
PROT SERPL-MCNC: 7.4 G/DL (ref 6.4–8.3)
PSA SERPL DL<=0.01 NG/ML-MCNC: 0.68 NG/ML (ref 0–3.5)
RBC # BLD AUTO: 5.44 10E6/UL (ref 4.4–5.9)
SODIUM SERPL-SCNC: 138 MMOL/L (ref 135–145)
WBC # BLD AUTO: 6 10E3/UL (ref 4–11)

## 2025-07-02 PROCEDURE — 1126F AMNT PAIN NOTED NONE PRSNT: CPT | Performed by: INTERNAL MEDICINE

## 2025-07-02 PROCEDURE — 3075F SYST BP GE 130 - 139MM HG: CPT | Performed by: INTERNAL MEDICINE

## 2025-07-02 PROCEDURE — 99396 PREV VISIT EST AGE 40-64: CPT | Performed by: INTERNAL MEDICINE

## 2025-07-02 PROCEDURE — 82570 ASSAY OF URINE CREATININE: CPT | Performed by: INTERNAL MEDICINE

## 2025-07-02 PROCEDURE — 82043 UR ALBUMIN QUANTITATIVE: CPT | Performed by: INTERNAL MEDICINE

## 2025-07-02 PROCEDURE — 85027 COMPLETE CBC AUTOMATED: CPT | Performed by: INTERNAL MEDICINE

## 2025-07-02 PROCEDURE — G0103 PSA SCREENING: HCPCS | Performed by: INTERNAL MEDICINE

## 2025-07-02 PROCEDURE — 3079F DIAST BP 80-89 MM HG: CPT | Performed by: INTERNAL MEDICINE

## 2025-07-02 PROCEDURE — 3051F HG A1C>EQUAL 7.0%<8.0%: CPT | Performed by: INTERNAL MEDICINE

## 2025-07-02 PROCEDURE — 83036 HEMOGLOBIN GLYCOSYLATED A1C: CPT | Performed by: INTERNAL MEDICINE

## 2025-07-02 PROCEDURE — 80053 COMPREHEN METABOLIC PANEL: CPT | Performed by: INTERNAL MEDICINE

## 2025-07-02 PROCEDURE — 36415 COLL VENOUS BLD VENIPUNCTURE: CPT | Performed by: INTERNAL MEDICINE

## 2025-07-02 SDOH — HEALTH STABILITY: PHYSICAL HEALTH: ON AVERAGE, HOW MANY MINUTES DO YOU ENGAGE IN EXERCISE AT THIS LEVEL?: 20 MIN

## 2025-07-02 SDOH — HEALTH STABILITY: PHYSICAL HEALTH: ON AVERAGE, HOW MANY DAYS PER WEEK DO YOU ENGAGE IN MODERATE TO STRENUOUS EXERCISE (LIKE A BRISK WALK)?: 2 DAYS

## 2025-07-02 ASSESSMENT — SOCIAL DETERMINANTS OF HEALTH (SDOH)
HOW OFTEN DO YOU GET TOGETHER WITH FRIENDS OR RELATIVES?: ONCE A WEEK
HOW OFTEN DO YOU GET TOGETHER WITH FRIENDS OR RELATIVES?: ONCE A WEEK

## 2025-07-02 ASSESSMENT — PAIN SCALES - GENERAL: PAINLEVEL_OUTOF10: NO PAIN (0)

## 2025-07-02 NOTE — PROGRESS NOTES
"Preventive Care Visit  [facility]  Osito Bennett DO, Internal Medicine  Jul 2, 2025      Assessment & Plan     Type 2 diabetes mellitus without complication, without long-term current use of insulin (H)    - Albumin Random Urine Quantitative with Creat Ratio; Future  - HEMOGLOBIN A1C; Future  - Comprehensive metabolic panel (BMP + Alb, Alk Phos, ALT, AST, Total. Bili, TP); Future  - CBC with platelets; Future    Umbilical hernia without obstruction and without gangrene  At this time, easily reducible and very small.  Discussed potential risks and things to watch for    Seborrheic keratosis  On back.  Benign.  Recommend observation    Screen for colon cancer  Screening  - Colonoscopy Screening  Referral; Future    Screening for prostate cancer  More screening  - PSA, screen; Future    Patient has been advised of split billing requirements and indicates understanding: Yes        BMI  Estimated body mass index is 28.43 kg/m  as calculated from the following:    Height as of this encounter: 1.84 m (6' 0.44\").    Weight as of this encounter: 96.3 kg (212 lb 3.2 oz).     Reviewed preventive health counseling, as reflected in patient instructions       Regular exercise       Healthy diet/nutrition       Vision screening       Hearing screening       Colorectal cancer screening       Consider prostate cancer screening (age 55-69)  Counseling  Appropriate preventive services were addressed with this patient via screening, questionnaire, or discussion as appropriate for fall prevention, nutrition, physical activity, Tobacco-use cessation, social engagement, weight loss and cognition.  Checklist reviewing preventive services available has been given to the patient.  Reviewed patient's diet, addressing concerns and/or questions.   He is at risk for lack of exercise and has been provided with information to increase physical activity for the benefit of his well-being.   The " patient was instructed to see the dentist every 6 months.   He is at risk for psychosocial distress and has been provided with information to reduce risk.             Subjective   Brendan is a 59 year old, presenting for the following:  Diabetes and Physical        7/2/2025     7:40 AM   Additional Questions   Roomed by Madelyn SHELLEY           Advance Care Planning    Discussed advance care planning with patient; however, patient declined at this time.        7/2/2025   General Health   How would you rate your overall physical health? Good   Feel stress (tense, anxious, or unable to sleep) To some extent   (!) STRESS CONCERN      7/2/2025   Nutrition   Three or more servings of calcium each day? (!) NO   Diet: Diabetic   How many servings of fruit and vegetables per day? (!) 2-3   How many sweetened beverages each day? 0-1         7/2/2025   Exercise   Days per week of moderate/strenous exercise 2 days   Average minutes spent exercising at this level 20 min   (!) EXERCISE CONCERN      7/2/2025   Social Factors   Frequency of gathering with friends or relatives Once a week   Worry food won't last until get money to buy more No   Food not last or not have enough money for food? No   Do you have housing? (Housing is defined as stable permanent housing and does not include staying outside in a car, in a tent, in an abandoned building, in an overnight shelter, or couch-surfing.) Yes   Are you worried about losing your housing? No   Lack of transportation? No   Unable to get utilities (heat,electricity)? No         7/2/2025   Fall Risk   Fallen 2 or more times in the past year? No     No   Trouble with walking or balance? No     No       Proxy-reported    Multiple values from one day are sorted in reverse-chronological order          7/2/2025   Dental   Dentist two times every year? (!) NO         Today's PHQ-2 Score:       7/2/2025     6:28 AM   PHQ-2 ( 1999 [employer])   Q1: Little interest or pleasure in doing things 0    Q2: Feeling down, depressed or hopeless 0   PHQ-2 Score 0    Q1: Little interest or pleasure in doing things Not at all   Q2: Feeling down, depressed or hopeless Not at all   PHQ-2 Score 0       Patient-reported           7/2/2025   Substance Use   Alcohol more than 3/day or more than 7/wk No   Do you use any other substances recreationally? No     Social History     Tobacco Use    Smoking status: Former    Smokeless tobacco: Never   Substance Use Topics    Alcohol use: Yes     Comment: rare    Drug use: Never           7/2/2025   STI Screening   New sexual partner(s) since last STI/HIV test? No   Last PSA:   PSA   Date Value Ref Range Status   01/26/2021 0.77 0 - 4 ug/L Final     Comment:     Assay Method:  Chemiluminescence using Siemens Vista analyzer     Prostate Specific Antigen Screen   Date Value Ref Range Status   08/12/2024 0.66 0.00 - 3.50 ng/mL Final     ASCVD Risk   The 10-year ASCVD risk score (Emanuel GUERRA, et al., 2019) is: 19.5%    Values used to calculate the score:      Age: 59 years      Sex: Male      Is Non- : No      Diabetic: Yes      Tobacco smoker: No      Systolic Blood Pressure: 137 mmHg      Is BP treated: No      HDL Cholesterol: 35 mg/dL      Total Cholesterol: 212 mg/dL           Reviewed and updated as needed this visit by Provider                    No past medical history on file.  Past Surgical History:   Procedure Laterality Date    COLONOSCOPY N/A 3/30/2018    Procedure: COMBINED COLONOSCOPY, SINGLE OR MULTIPLE BIOPSY/POLYPECTOMY BY BIOPSY;  Colonoscopy, Polypectomy by Biopsy;  Surgeon: Hesham Greer MD;  Location:  GI     Lab work is in process  Labs reviewed in EPIC  BP Readings from Last 3 Encounters:   07/02/25 137/80   08/12/24 118/82   10/27/23 124/74    Wt Readings from Last 3 Encounters:   07/02/25 96.3 kg (212 lb 3.2 oz)   08/12/24 94.2 kg (207 lb 11.2 oz)   10/27/23 94.1 kg (207 lb 8 oz)                  Patient Active Problem List    Diagnosis    CARDIOVASCULAR SCREENING; LDL GOAL LESS THAN 160    Hyperlipidemia LDL goal <100    Type 2 diabetes mellitus without complication, without long-term current use of insulin (H)     Past Surgical History:   Procedure Laterality Date    COLONOSCOPY N/A 3/30/2018    Procedure: COMBINED COLONOSCOPY, SINGLE OR MULTIPLE BIOPSY/POLYPECTOMY BY BIOPSY;  Colonoscopy, Polypectomy by Biopsy;  Surgeon: Hesham Greer MD;  Location:  GI       Social History     Tobacco Use    Smoking status: Former    Smokeless tobacco: Never   Substance Use Topics    Alcohol use: Yes     Comment: rare     Family History   Problem Relation Age of Onset    Diabetes Mother     Diabetes Father     Myocardial Infarction Father     Diabetes Sister     Diabetes Son          Current Outpatient Medications   Medication Sig Dispense Refill    blood glucose (NO BRAND SPECIFIED) lancets standard Use to test blood sugar 4 times daily or as directed. 120 each 4    blood glucose (NO BRAND SPECIFIED) test strip Use to test blood sugar 4 times daily or as directed. 200 strip 3    blood glucose monitoring (NO BRAND SPECIFIED) meter device kit Use to test blood sugar 4 times daily or as directed. 1 kit 0    glimepiride (AMARYL) 2 MG tablet TAKE ONE TABLET BY MOUTH EVERY MORNING BEFORE BREAKFAST 90 tablet 0    metFORMIN (GLUCOPHAGE) 500 MG tablet TAKE 2 TABLETS BY MOUTH EVERY MORNING AND 1 EVERY EVENING 90 tablet 1    lisinopril (ZESTRIL) 5 MG tablet Take 0.5 tablets (2.5 mg) by mouth daily 45 tablet 3     No Known Allergies  Recent Labs   Lab Test 08/12/24  0738 10/27/23  0827 05/25/23  0803 07/15/22  1122 07/15/22  1122 01/31/22  0817 01/31/22  0817 01/26/21  0850 01/07/20  1408 01/01/20  1044 07/18/19  0906 10/30/18  0758   A1C 9.6* 8.4* 7.6*  --   --    < > 9.0* 7.5*   < >  --    < > 7.4*   LDL 91  --  63  --   --   --  59  --   --   --    < > Cannot estimate LDL when triglyceride exceeds 400 mg/dL   HDL 35*  --  34*  --   --   --  35*  --    "--   --    < > 39*   TRIG 563*  --  323*  --   --   --  385*  --   --   --    < > 455*   ALT 41 38  --   --  46  --   --   --   --  32   < >  --    CR 0.90 0.85 0.91  --  1.08   < > 0.81 0.79  --  0.84   < > 0.81   GFRESTIMATED >90 >90 >90  --  82   < > >90 >90  --  >90   < > >90   GFRESTBLACK  --   --   --   --   --   --   --  >90  --  >90   < > >90   POTASSIUM 4.4 4.1 4.4   < > 4.1   < > 4.0 4.0  --  3.9   < > 3.9   TSH  --   --   --   --   --   --   --   --   --   --   --  1.26    < > = values in this interval not displayed.          Review of Systems  CONSTITUTIONAL: NEGATIVE for fever, chills, change in weight  INTEGUMENTARY/SKIN: NEGATIVE for worrisome rashes, moles or lesions  EYES: NEGATIVE for vision changes or irritation  ENT/MOUTH: NEGATIVE for ear, mouth and throat problems  RESP: NEGATIVE for significant cough or SOB  BREAST: NEGATIVE for masses, tenderness or discharge  CV: NEGATIVE for chest pain, palpitations or peripheral edema  GI: NEGATIVE for nausea, abdominal pain, heartburn, or change in bowel habits  : NEGATIVE for frequency, dysuria, or hematuria  MUSCULOSKELETAL: Patient complains of occasional muscle tightness associated with inactivity.  This is random and modest  NEURO: NEGATIVE for weakness, dizziness or paresthesias  ENDOCRINE: NEGATIVE for temperature intolerance, skin/hair changes  HEME: NEGATIVE for bleeding problems  PSYCHIATRIC: NEGATIVE for changes in mood or affect     Objective    Exam  /80   Pulse 73   Temp 97.9  F (36.6  C) (Temporal)   Resp 16   Ht 1.84 m (6' 0.44\")   Wt 96.3 kg (212 lb 3.2 oz)   SpO2 96%   BMI 28.43 kg/m     Estimated body mass index is 28.43 kg/m  as calculated from the following:    Height as of this encounter: 1.84 m (6' 0.44\").    Weight as of this encounter: 96.3 kg (212 lb 3.2 oz).    Physical Exam  GENERAL: alert and no distress  EYES: Eyes grossly normal to inspection, PERRL and conjunctivae and sclerae normal  HENT: ear canals and " TM's normal, nose and mouth without ulcers or lesions  NECK: no adenopathy, no asymmetry, masses, or scars  RESP: lungs clear to auscultation - no rales, rhonchi or wheezes  CV: regular rate and rhythm, normal S1 S2, no S3 or S4, no murmur, click or rub, no peripheral edema  ABDOMEN: soft, nontender, no hepatosplenomegaly, no masses and bowel sounds normal  MS: no gross musculoskeletal defects noted, no edema  SKIN: no suspicious lesions or rashes and patient has a couple dime size superficial seborrheic keratosis on his back.  No signs of malignancy  NEURO: Normal strength and tone, mentation intact and speech normal  PSYCH: mentation appears normal, affect normal/bright  Feet: no evidence of skin breakdown or ulceration is noted.  Sensation is intact to monofilament and vibration.  Pulses are strong, capillary refill is brisk.      I have reviewed the patient's vaccination schedule and discussed the benefits of prophylactic vaccination in detail.  I recommend the patient contact their pharmacist for vaccinations.  Discussed that most insurance companies now favor reimbursement to the pharmacies and it will financially behoove the patient to have vaccinations performed at their pharmacy.        Signed Electronically by: Osito Bennett DO

## 2025-08-27 DIAGNOSIS — E11.9 TYPE 2 DIABETES MELLITUS WITHOUT COMPLICATION, WITHOUT LONG-TERM CURRENT USE OF INSULIN (H): ICD-10-CM

## (undated) RX ORDER — FENTANYL CITRATE 50 UG/ML
INJECTION, SOLUTION INTRAMUSCULAR; INTRAVENOUS
Status: DISPENSED
Start: 2018-03-30